# Patient Record
Sex: FEMALE | Race: WHITE | NOT HISPANIC OR LATINO | Employment: FULL TIME | URBAN - METROPOLITAN AREA
[De-identification: names, ages, dates, MRNs, and addresses within clinical notes are randomized per-mention and may not be internally consistent; named-entity substitution may affect disease eponyms.]

---

## 2022-02-10 ENCOUNTER — INITIAL PRENATAL (OUTPATIENT)
Dept: OBSTETRICS AND GYNECOLOGY | Facility: CLINIC | Age: 37
End: 2022-02-10
Payer: COMMERCIAL

## 2022-02-10 VITALS
BODY MASS INDEX: 24.32 KG/M2 | SYSTOLIC BLOOD PRESSURE: 92 MMHG | WEIGHT: 173.75 LBS | DIASTOLIC BLOOD PRESSURE: 58 MMHG | HEIGHT: 71 IN

## 2022-02-10 DIAGNOSIS — R30.0 DYSURIA: ICD-10-CM

## 2022-02-10 DIAGNOSIS — O21.9 NAUSEA/VOMITING IN PREGNANCY: Primary | ICD-10-CM

## 2022-02-10 LAB
BACTERIA #/AREA URNS AUTO: ABNORMAL /HPF
BILIRUB UR QL STRIP: NEGATIVE
CLARITY UR REFRACT.AUTO: ABNORMAL
COLOR UR AUTO: YELLOW
GLUCOSE UR QL STRIP: NEGATIVE
HGB UR QL STRIP: NEGATIVE
KETONES UR QL STRIP: NEGATIVE
LEUKOCYTE ESTERASE UR QL STRIP: ABNORMAL
MICROSCOPIC COMMENT: ABNORMAL
NITRITE UR QL STRIP: NEGATIVE
PH UR STRIP: 7 [PH] (ref 5–8)
PROT UR QL STRIP: NEGATIVE
RBC #/AREA URNS AUTO: 2 /HPF (ref 0–4)
SP GR UR STRIP: 1.02 (ref 1–1.03)
SQUAMOUS #/AREA URNS AUTO: 8 /HPF
URN SPEC COLLECT METH UR: ABNORMAL
WBC #/AREA URNS AUTO: 4 /HPF (ref 0–5)
YEAST UR QL AUTO: ABNORMAL

## 2022-02-10 PROCEDURE — 99999 PR PBB SHADOW E&M-NEW PATIENT-LVL III: CPT | Mod: PBBFAC,,, | Performed by: OBSTETRICS & GYNECOLOGY

## 2022-02-10 PROCEDURE — 87088 URINE BACTERIA CULTURE: CPT | Performed by: OBSTETRICS & GYNECOLOGY

## 2022-02-10 PROCEDURE — 87186 SC STD MICRODIL/AGAR DIL: CPT | Performed by: OBSTETRICS & GYNECOLOGY

## 2022-02-10 PROCEDURE — 87086 URINE CULTURE/COLONY COUNT: CPT | Performed by: OBSTETRICS & GYNECOLOGY

## 2022-02-10 PROCEDURE — 81001 URINALYSIS AUTO W/SCOPE: CPT | Performed by: OBSTETRICS & GYNECOLOGY

## 2022-02-10 PROCEDURE — 87077 CULTURE AEROBIC IDENTIFY: CPT | Performed by: OBSTETRICS & GYNECOLOGY

## 2022-02-10 PROCEDURE — 0500F PR INITIAL PRENATAL CARE VISIT: ICD-10-PCS | Mod: CPTII,S$GLB,, | Performed by: OBSTETRICS & GYNECOLOGY

## 2022-02-10 PROCEDURE — 99999 PR PBB SHADOW E&M-NEW PATIENT-LVL III: ICD-10-PCS | Mod: PBBFAC,,, | Performed by: OBSTETRICS & GYNECOLOGY

## 2022-02-10 PROCEDURE — 0500F INITIAL PRENATAL CARE VISIT: CPT | Mod: CPTII,S$GLB,, | Performed by: OBSTETRICS & GYNECOLOGY

## 2022-02-10 RX ORDER — PROMETHAZINE HYDROCHLORIDE 25 MG/1
25 TABLET ORAL EVERY 4 HOURS
COMMUNITY
End: 2023-01-17

## 2022-02-10 RX ORDER — ONDANSETRON 4 MG/1
4 TABLET, ORALLY DISINTEGRATING ORAL EVERY 6 HOURS PRN
Qty: 30 TABLET | Refills: 2 | Status: SHIPPED | OUTPATIENT
Start: 2022-02-10 | End: 2023-06-08

## 2022-02-10 RX ORDER — ONDANSETRON 4 MG/1
8 TABLET, FILM COATED ORAL 2 TIMES DAILY
COMMUNITY
End: 2023-11-02

## 2022-02-10 NOTE — PROGRESS NOTES
"  Reason for visit: Initial Prenatal Visit and Nausea (C/o nausea this morning has Phenergan and Zofran)      HPI:   36 y.o., at 14w4d by Estimated Date of Delivery: 8/7/22    Pt will be splitting care between [facility] and [place]. Presents today for 14wk appt. First visit at [facility]. 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  Experiencing N/V and questions about zofran safety in pregnancy   Experiencing constipation and questions about Mg supplementation in pregnancy  Hopes to deliver in [place] via planned C/S due to history of fundal mymectomy   C/o dysuria x3 days    - Cramping: N  - Bleeding: N  - Discharge: N  - Fetal movement: Not yet   - Nausea: Y  - Vomiting: Y  - Headache: N      Reviewed:    Past medical, surgical, social, family, and obstetric history: Reviewed and updated in EMR.  Medications: Reviewed and updated in EMR.  Allergies: Patient has no known allergies.    Pregnancy dating, labs, ultrasound reports, prenatal testing, and problem list: Reviewed and updated in EMR.  Outside records: None but available on patients phone  Independent interpretation of tests: NA  Discussion with another healthcare professional: NA      Vitals: BP (!) 92/58   Ht 5' 11" (1.803 m)   Wt 78.8 kg (173 lb 11.6 oz)   BMI 24.23 kg/m²     Physical exam:  GENERAL: No acute distress  ABD: Gravid below umbilicus  FHT: 160      Assessment and Plan:    Nausea/vomiting in pregnancy  -     ondansetron (ZOFRAN-ODT) 4 MG TbDL; Take 1 tablet (4 mg total) by mouth every 6 (six) hours as needed (Nausea and vomiting).  Dispense: 30 tablet; Refill: 2    Dysuria  -     Urine culture  -     Urinalysis         Discussed safety of zofran in pregnancy, and counseled regarding instructions for use   Discussed pts chronic constipation and counseled regarding options for supplementation, including Mg (as recommended by outside provider)   Discussed plan for care between [facility] and [facility]. Pt scheduled to see [place] " physicians through 20wk appointment. Scheduled for 24-26wk glucose screen at [facility] precautions given  Follow-up: 4 weeks at [facility]     Rosalie Rosario  MS3    Seen and examined.  Agree with note.  All questions answered  TIGRE Reece MD

## 2022-02-11 ENCOUNTER — PATIENT MESSAGE (OUTPATIENT)
Dept: OBSTETRICS AND GYNECOLOGY | Facility: CLINIC | Age: 37
End: 2022-02-11
Payer: COMMERCIAL

## 2022-02-11 DIAGNOSIS — R30.0 DYSURIA DURING PREGNANCY, ANTEPARTUM: Primary | ICD-10-CM

## 2022-02-11 DIAGNOSIS — O26.899 DYSURIA DURING PREGNANCY, ANTEPARTUM: Primary | ICD-10-CM

## 2022-02-11 RX ORDER — NITROFURANTOIN 25; 75 MG/1; MG/1
100 CAPSULE ORAL 2 TIMES DAILY
Qty: 14 CAPSULE | Refills: 0 | Status: SHIPPED | OUTPATIENT
Start: 2022-02-11 | End: 2022-02-18

## 2022-02-13 LAB — BACTERIA UR CULT: ABNORMAL

## 2022-02-16 ENCOUNTER — ROUTINE PRENATAL (OUTPATIENT)
Dept: OBSTETRICS AND GYNECOLOGY | Facility: CLINIC | Age: 37
End: 2022-02-16
Payer: COMMERCIAL

## 2022-02-16 VITALS — WEIGHT: 179 LBS | SYSTOLIC BLOOD PRESSURE: 90 MMHG | DIASTOLIC BLOOD PRESSURE: 58 MMHG | BODY MASS INDEX: 24.97 KG/M2
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
## 2022-02-16 PROCEDURE — 99999 PR PBB SHADOW E&M-EST. PATIENT-LVL II: ICD-10-PCS | Mod: PBBFAC,,, | Performed by: OBSTETRICS & GYNECOLOGY

## 2022-02-16 PROCEDURE — 99999 PR PBB SHADOW E&M-EST. PATIENT-LVL II: CPT | Mod: PBBFAC,,, | Performed by: OBSTETRICS & GYNECOLOGY

## 2022-02-16 PROCEDURE — 0502F PR SUBSEQUENT PRENATAL CARE: ICD-10-PCS | Mod: CPTII,S$GLB,, | Performed by: OBSTETRICS & GYNECOLOGY

## 2022-02-16 PROCEDURE — 0502F SUBSEQUENT PRENATAL CARE: CPT | Mod: CPTII,S$GLB,, | Performed by: OBSTETRICS & GYNECOLOGY

## 2022-02-16 RX ORDER — FAMOTIDINE 20 MG/1
20 TABLET, FILM COATED ORAL 2 TIMES DAILY
COMMUNITY
End: 2023-11-30

## 2022-02-16 NOTE — PROGRESS NOTES
Reason for visit: Routine Prenatal Visit      HPI:   36 y.o., at 15w3d by Estimated Date of Delivery: 8/7/22      Pt presents for evaluation after falling last night. Reports she was in the bathroom vomiting and when she stood up, felt lightheaded and then tripped over her bathmat. States she fell onto her elbows, but hit the side of her abdomen. Reports having very minor cramping, but no bleeding. States she overall is feeling fine, but wanted to get checked out to make sure everything is okay.     Also reports nausea/vomiting is at baseline for her. States she is able to keep food down for several hours, but then does have episodes of vomiting almost daily. Still gaining weight and keeping down liquids and some solids.     - Contractions:  No  - Bleeding:  No  - Loss of fluid:  No  - Fetal movement: N/A  - Nausea:  Yes  - Vomiting:  Yes  - Headache:  No      Reviewed:    Past medical, surgical, social, family, and obstetric history: Reviewed and updated in EMR.  Medications: Reviewed and updated in EMR.  Allergies: Patient has no known allergies.    Pregnancy dating, labs, ultrasound reports, prenatal testing, and problem list: Reviewed and updated in EMR.  Outside records: Available records reviewed      Vitals: BP (!) 90/58   Wt 81.2 kg (179 lb 0.2 oz)   BMI 24.97 kg/m²     Physical exam:  GENERAL: No acute distress  ABD: Gravid    BSUS: Fetal movement with HR visually 150s      Assessment and Plan:    Fall, initial encounter      Patient reassured that given no bleeding/cramping with active FM and normal-appearing FCA on BSUS, it is unlikely that she will have long-term sequelae from fall. Also discussed that highest risk of sequelae is within 24 hours from fall and to continue looking for cramping/bleeding and notify us if these occur. All questions answered.      Pain and bleeding precautions given  Follow-up: 3 weeks      Jennifer Mansfield MD   PGY-2, OB-GYN

## 2022-02-28 ENCOUNTER — PATIENT MESSAGE (OUTPATIENT)
Dept: OBSTETRICS AND GYNECOLOGY | Facility: CLINIC | Age: 37
End: 2022-02-28
Payer: COMMERCIAL

## 2022-03-02 ENCOUNTER — TELEPHONE (OUTPATIENT)
Dept: OBSTETRICS AND GYNECOLOGY | Facility: CLINIC | Age: 37
End: 2022-03-02
Payer: COMMERCIAL

## 2022-03-03 DIAGNOSIS — O09.529 ANTEPARTUM MULTIGRAVIDA OF ADVANCED MATERNAL AGE: Primary | ICD-10-CM

## 2022-03-28 ENCOUNTER — TELEPHONE (OUTPATIENT)
Dept: RESEARCH | Facility: OTHER | Age: 37
End: 2022-03-28
Payer: COMMERCIAL

## 2022-03-28 ENCOUNTER — PATIENT MESSAGE (OUTPATIENT)
Dept: MATERNAL FETAL MEDICINE | Facility: CLINIC | Age: 37
End: 2022-03-28
Payer: COMMERCIAL

## 2022-03-28 NOTE — TELEPHONE ENCOUNTER
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  Wants us to discuss with the PI and feels this may conflict with protocol.  Also wants to discuss with her  who is also MD.  Requested we call her back on Wednesday.  CRC will contact PI and call patient on Wednesday.  "

## 2022-03-29 ENCOUNTER — PROCEDURE VISIT (OUTPATIENT)
Dept: MATERNAL FETAL MEDICINE | Facility: CLINIC | Age: 37
End: 2022-03-29
Payer: COMMERCIAL

## 2022-03-29 DIAGNOSIS — Z36.89 ENCOUNTER FOR ULTRASOUND TO ASSESS FETAL GROWTH: Primary | ICD-10-CM

## 2022-03-29 DIAGNOSIS — O09.529 ANTEPARTUM MULTIGRAVIDA OF ADVANCED MATERNAL AGE: ICD-10-CM

## 2022-03-29 PROCEDURE — 76811 OB US DETAILED SNGL FETUS: CPT | Mod: S$GLB,,, | Performed by: OBSTETRICS & GYNECOLOGY

## 2022-03-29 PROCEDURE — 76811 US MFM PROCEDURE (VIEWPOINT): ICD-10-PCS | Mod: S$GLB,,, | Performed by: OBSTETRICS & GYNECOLOGY

## 2022-04-12 ENCOUNTER — TELEPHONE (OUTPATIENT)
Dept: PEDIATRICS | Facility: CLINIC | Age: 37
End: 2022-04-12
Payer: COMMERCIAL

## 2022-04-12 NOTE — TELEPHONE ENCOUNTER
----- Message from Jed Ray RN sent at 4/11/2022  4:09 PM CDT -----  Contact: Pt @[phone]  Can you help with this please?  ----- Message -----  From: Rosalia Marie  Sent: 4/11/2022   4:03 PM CDT  To: [facility]klin Staff    Patient would like to get medical advice.  meet and greet     Would you like a call back, or a response through your [facility] portal?:  call back       Comments:     I am unable to schedule an meet and greet virtual or in person with the provider. Mom will be having babies in [place] and will be traveling back and forth for appt. Please call pt back to schedule an appt.

## 2022-05-20 ENCOUNTER — PATIENT MESSAGE (OUTPATIENT)
Dept: OBSTETRICS AND GYNECOLOGY | Facility: CLINIC | Age: 37
End: 2022-05-20
Payer: COMMERCIAL

## 2022-05-24 ENCOUNTER — TELEPHONE (OUTPATIENT)
Dept: OBSTETRICS AND GYNECOLOGY | Facility: CLINIC | Age: 37
End: 2022-05-24
Payer: COMMERCIAL

## 2022-05-24 NOTE — TELEPHONE ENCOUNTER
I have attempted without success to contact this patient by phone lvm pt need weekly isaias appointments per Dr. Reece

## 2022-05-26 ENCOUNTER — ROUTINE PRENATAL (OUTPATIENT)
Dept: OBSTETRICS AND GYNECOLOGY | Facility: CLINIC | Age: 37
End: 2022-05-26
Attending: OBSTETRICS & GYNECOLOGY
Payer: COMMERCIAL

## 2022-05-26 VITALS
DIASTOLIC BLOOD PRESSURE: 60 MMHG | WEIGHT: 209.44 LBS | BODY MASS INDEX: 29.21 KG/M2 | SYSTOLIC BLOOD PRESSURE: 102 MMHG

## 2022-05-26 DIAGNOSIS — O09.93 SUPERVISION OF HIGH RISK PREGNANCY IN THIRD TRIMESTER: ICD-10-CM

## 2022-05-26 DIAGNOSIS — O09.513 PRIMIGRAVIDA OF ADVANCED MATERNAL AGE IN THIRD TRIMESTER: ICD-10-CM

## 2022-05-26 DIAGNOSIS — O34.29 PREGNANCY WITH HISTORY OF UTERINE MYOMECTOMY: ICD-10-CM

## 2022-05-26 PROCEDURE — 0502F PR SUBSEQUENT PRENATAL CARE: ICD-10-PCS | Mod: CPTII,S$GLB,, | Performed by: OBSTETRICS & GYNECOLOGY

## 2022-05-26 PROCEDURE — 99999 PR PBB SHADOW E&M-EST. PATIENT-LVL II: CPT | Mod: PBBFAC,,, | Performed by: OBSTETRICS & GYNECOLOGY

## 2022-05-26 PROCEDURE — 0502F SUBSEQUENT PRENATAL CARE: CPT | Mod: CPTII,S$GLB,, | Performed by: OBSTETRICS & GYNECOLOGY

## 2022-05-26 PROCEDURE — 99999 PR PBB SHADOW E&M-EST. PATIENT-LVL II: ICD-10-PCS | Mod: PBBFAC,,, | Performed by: OBSTETRICS & GYNECOLOGY

## 2022-05-26 RX ORDER — ONDANSETRON 8 MG/1
8 TABLET, ORALLY DISINTEGRATING ORAL EVERY 8 HOURS PRN
COMMUNITY
Start: 2022-04-10 | End: 2023-06-08 | Stop reason: SDUPTHER

## 2022-05-26 RX ORDER — PROMETHAZINE HYDROCHLORIDE 12.5 MG/1
12.5 TABLET ORAL EVERY 6 HOURS PRN
COMMUNITY
Start: 2022-01-05 | End: 2023-01-17

## 2022-05-26 RX ORDER — DOXYLAMINE SUCCINATE AND PYRIDOXINE HYDROCHLORIDE, DELAYED RELEASE TABLETS 10 MG/10 MG 10; 10 MG/1; MG/1
TABLET, DELAYED RELEASE ORAL
COMMUNITY
Start: 2022-03-19 | End: 2023-11-02

## 2022-05-26 NOTE — PROGRESS NOTES
Good fm.  Denies ctx, vb, lof   She had questions about her c/s date and bmx.  Recommend record release in case she delivers while in [place] and to have a better understanding of her care.

## 2022-06-02 ENCOUNTER — ROUTINE PRENATAL (OUTPATIENT)
Dept: OBSTETRICS AND GYNECOLOGY | Facility: CLINIC | Age: 37
End: 2022-06-02
Attending: OBSTETRICS & GYNECOLOGY
Payer: COMMERCIAL

## 2022-06-02 VITALS
BODY MASS INDEX: 29.52 KG/M2 | DIASTOLIC BLOOD PRESSURE: 62 MMHG | SYSTOLIC BLOOD PRESSURE: 102 MMHG | WEIGHT: 211.63 LBS

## 2022-06-02 DIAGNOSIS — O09.513 PRIMIGRAVIDA OF ADVANCED MATERNAL AGE IN THIRD TRIMESTER: ICD-10-CM

## 2022-06-02 DIAGNOSIS — O34.29 PREGNANCY WITH HISTORY OF UTERINE MYOMECTOMY: ICD-10-CM

## 2022-06-02 DIAGNOSIS — O09.93 SUPERVISION OF HIGH RISK PREGNANCY IN THIRD TRIMESTER: Primary | ICD-10-CM

## 2022-06-02 PROCEDURE — 0502F SUBSEQUENT PRENATAL CARE: CPT | Mod: CPTII,S$GLB,, | Performed by: OBSTETRICS & GYNECOLOGY

## 2022-06-02 PROCEDURE — 0502F PR SUBSEQUENT PRENATAL CARE: ICD-10-PCS | Mod: CPTII,S$GLB,, | Performed by: OBSTETRICS & GYNECOLOGY

## 2022-06-02 PROCEDURE — 99999 PR PBB SHADOW E&M-EST. PATIENT-LVL II: CPT | Mod: PBBFAC,,, | Performed by: OBSTETRICS & GYNECOLOGY

## 2022-06-02 PROCEDURE — 99999 PR PBB SHADOW E&M-EST. PATIENT-LVL II: ICD-10-PCS | Mod: PBBFAC,,, | Performed by: OBSTETRICS & GYNECOLOGY

## 2022-06-02 NOTE — PROGRESS NOTES
Pregnancy dating, labs, ultrasound reports, prenatal testing, and problem list; prior records and results; and available outside records were reviewed and updated in EMR.  Pertinent findings were noted below.    Reason for Visit   Routine Prenatal Visit    HPI   37 y.o., at 30w4d by Estimated Date of Delivery: 22      No new complaints. Pt has been checking BP regularly and everything has been within normal limits.     Contractions: No   Bleeding: No   Loss of fluid: No   Fetal movement: Yes   Nausea: No   Vomiting: No   Headache: No     Exam   /62   Wt 96 kg (211 lb 10.3 oz)   BMI 29.52 kg/m²     GENERAL: No acute distress  ABD: Gravid. Fetal Heart Tones: 142 BPM. Fundal Height: 32 cm.    Assessment and Plan   Supervision of high risk pregnancy in third trimester    Primigravida of advanced maternal age in third trimester    Pregnancy with history of uterine myomectomy             labor and Labor precautions given  Follow-up: 3 weeks     Mychal Holly Student

## 2022-06-02 NOTE — PROGRESS NOTES
Seen and examined.  Agree with note.  All questions answered  Discussed history of myomectomy and recommendations regarding delivery timing.  I recommend that she bring in her op note to be able to  appropriately.  I also recommend getting her prenatal records from [place] to able to better answer her questions regarding her care.

## 2022-06-08 ENCOUNTER — PATIENT MESSAGE (OUTPATIENT)
Dept: OBSTETRICS AND GYNECOLOGY | Facility: CLINIC | Age: 37
End: 2022-06-08
Payer: COMMERCIAL

## 2022-06-16 ENCOUNTER — TELEPHONE (OUTPATIENT)
Dept: OBSTETRICS AND GYNECOLOGY | Facility: CLINIC | Age: 37
End: 2022-06-16
Payer: COMMERCIAL

## 2022-06-16 NOTE — TELEPHONE ENCOUNTER
S/w pt , states she has covid  States She is not symptomatic  Advised to isolate/quarentine herself  Stay hydrated    If she becomes symptomatic she needs to go to [place]

## 2022-06-16 NOTE — TELEPHONE ENCOUNTER
----- Message from Glen Sellers sent at 6/16/2022 10:17 AM CDT -----  Name of Who is Calling: YULIA BURCH          What is the request in detail: The patient is calling to speak to the nurse in regards to a problem she is having now. Please advise          Can the clinic reply by [facility]:Yes         What Number to Call Back if not in [facility]: [phone]

## 2023-01-17 ENCOUNTER — OFFICE VISIT (OUTPATIENT)
Dept: FAMILY MEDICINE | Facility: CLINIC | Age: 38
End: 2023-01-17
Payer: COMMERCIAL

## 2023-01-17 VITALS
HEIGHT: 71 IN | DIASTOLIC BLOOD PRESSURE: 64 MMHG | SYSTOLIC BLOOD PRESSURE: 107 MMHG | TEMPERATURE: 99 F | WEIGHT: 176.56 LBS | RESPIRATION RATE: 16 BRPM | BODY MASS INDEX: 24.72 KG/M2

## 2023-01-17 DIAGNOSIS — M54.50 ACUTE LEFT-SIDED LOW BACK PAIN WITHOUT SCIATICA: Primary | ICD-10-CM

## 2023-01-17 PROBLEM — O34.29 PREGNANCY WITH HISTORY OF UTERINE MYOMECTOMY: Status: RESOLVED | Noted: 2022-05-26 | Resolved: 2023-01-17

## 2023-01-17 PROBLEM — O09.513 PRIMIGRAVIDA OF ADVANCED MATERNAL AGE IN THIRD TRIMESTER: Status: RESOLVED | Noted: 2022-05-26 | Resolved: 2023-01-17

## 2023-01-17 PROBLEM — O09.93 SUPERVISION OF HIGH RISK PREGNANCY IN THIRD TRIMESTER: Status: RESOLVED | Noted: 2022-05-26 | Resolved: 2023-01-17

## 2023-01-17 LAB
BILIRUB SERPL-MCNC: ABNORMAL MG/DL
BLOOD, POC UA: 250
GLUCOSE UR QL STRIP: ABNORMAL
KETONES UR QL STRIP: ABNORMAL
LEUKOCYTE ESTERASE URINE, POC: ABNORMAL
NITRITE, POC UA: ABNORMAL
PH, POC UA: 6
PROTEIN, POC: ABNORMAL
SPECIFIC GRAVITY, POC UA: 1.01
UROBILINOGEN, POC UA: NORMAL

## 2023-01-17 PROCEDURE — 96372 THER/PROPH/DIAG INJ SC/IM: CPT | Mod: S$GLB,,, | Performed by: STUDENT IN AN ORGANIZED HEALTH CARE EDUCATION/TRAINING PROGRAM

## 2023-01-17 PROCEDURE — 3074F PR MOST RECENT SYSTOLIC BLOOD PRESSURE < 130 MM HG: ICD-10-PCS | Mod: CPTII,S$GLB,, | Performed by: STUDENT IN AN ORGANIZED HEALTH CARE EDUCATION/TRAINING PROGRAM

## 2023-01-17 PROCEDURE — 1159F MED LIST DOCD IN RCRD: CPT | Mod: CPTII,S$GLB,, | Performed by: STUDENT IN AN ORGANIZED HEALTH CARE EDUCATION/TRAINING PROGRAM

## 2023-01-17 PROCEDURE — 99999 PR PBB SHADOW E&M-EST. PATIENT-LVL IV: CPT | Mod: PBBFAC,,, | Performed by: STUDENT IN AN ORGANIZED HEALTH CARE EDUCATION/TRAINING PROGRAM

## 2023-01-17 PROCEDURE — 3078F PR MOST RECENT DIASTOLIC BLOOD PRESSURE < 80 MM HG: ICD-10-PCS | Mod: CPTII,S$GLB,, | Performed by: STUDENT IN AN ORGANIZED HEALTH CARE EDUCATION/TRAINING PROGRAM

## 2023-01-17 PROCEDURE — 3078F DIAST BP <80 MM HG: CPT | Mod: CPTII,S$GLB,, | Performed by: STUDENT IN AN ORGANIZED HEALTH CARE EDUCATION/TRAINING PROGRAM

## 2023-01-17 PROCEDURE — 96372 PR INJECTION,THERAP/PROPH/DIAG2ST, IM OR SUBCUT: ICD-10-PCS | Mod: S$GLB,,, | Performed by: STUDENT IN AN ORGANIZED HEALTH CARE EDUCATION/TRAINING PROGRAM

## 2023-01-17 PROCEDURE — 81003 URINALYSIS AUTO W/O SCOPE: CPT | Mod: QW,S$GLB,, | Performed by: STUDENT IN AN ORGANIZED HEALTH CARE EDUCATION/TRAINING PROGRAM

## 2023-01-17 PROCEDURE — 3008F PR BODY MASS INDEX (BMI) DOCUMENTED: ICD-10-PCS | Mod: CPTII,S$GLB,, | Performed by: STUDENT IN AN ORGANIZED HEALTH CARE EDUCATION/TRAINING PROGRAM

## 2023-01-17 PROCEDURE — 3074F SYST BP LT 130 MM HG: CPT | Mod: CPTII,S$GLB,, | Performed by: STUDENT IN AN ORGANIZED HEALTH CARE EDUCATION/TRAINING PROGRAM

## 2023-01-17 PROCEDURE — 99214 PR OFFICE/OUTPT VISIT, EST, LEVL IV, 30-39 MIN: ICD-10-PCS | Mod: 25,S$GLB,, | Performed by: STUDENT IN AN ORGANIZED HEALTH CARE EDUCATION/TRAINING PROGRAM

## 2023-01-17 PROCEDURE — 3008F BODY MASS INDEX DOCD: CPT | Mod: CPTII,S$GLB,, | Performed by: STUDENT IN AN ORGANIZED HEALTH CARE EDUCATION/TRAINING PROGRAM

## 2023-01-17 PROCEDURE — 81003 POCT URINALYSIS: ICD-10-PCS | Mod: QW,S$GLB,, | Performed by: STUDENT IN AN ORGANIZED HEALTH CARE EDUCATION/TRAINING PROGRAM

## 2023-01-17 PROCEDURE — 1159F PR MEDICATION LIST DOCUMENTED IN MEDICAL RECORD: ICD-10-PCS | Mod: CPTII,S$GLB,, | Performed by: STUDENT IN AN ORGANIZED HEALTH CARE EDUCATION/TRAINING PROGRAM

## 2023-01-17 PROCEDURE — 99999 PR PBB SHADOW E&M-EST. PATIENT-LVL IV: ICD-10-PCS | Mod: PBBFAC,,, | Performed by: STUDENT IN AN ORGANIZED HEALTH CARE EDUCATION/TRAINING PROGRAM

## 2023-01-17 PROCEDURE — 99214 OFFICE O/P EST MOD 30 MIN: CPT | Mod: 25,S$GLB,, | Performed by: STUDENT IN AN ORGANIZED HEALTH CARE EDUCATION/TRAINING PROGRAM

## 2023-01-17 RX ORDER — KETOROLAC TROMETHAMINE 30 MG/ML
30 INJECTION, SOLUTION INTRAMUSCULAR; INTRAVENOUS
Status: COMPLETED | OUTPATIENT
Start: 2023-01-17 | End: 2023-01-17

## 2023-01-17 RX ORDER — HYDROCORTISONE 25 MG/G
1 OINTMENT TOPICAL 2 TIMES DAILY PRN
COMMUNITY
Start: 2022-12-24 | End: 2023-09-15

## 2023-01-17 RX ORDER — CYCLOBENZAPRINE HCL 10 MG
10 TABLET ORAL 3 TIMES DAILY PRN
Qty: 30 TABLET | Refills: 1 | Status: SHIPPED | OUTPATIENT
Start: 2023-01-17 | End: 2023-01-27

## 2023-01-17 RX ADMIN — KETOROLAC TROMETHAMINE 30 MG: 30 INJECTION, SOLUTION INTRAMUSCULAR; INTRAVENOUS at 01:01

## 2023-01-17 NOTE — PROGRESS NOTES
Administered Ketorolac 30 mg IM to right upper outer quad gluteus.  Patient tolerated injection well, no adverse reactions noted.

## 2023-01-17 NOTE — PROGRESS NOTES
SUBJECTIVE     Chief Complaint   Patient presents with    Back Pain       HPI  Sangeeta Paniagua is a 37 y.o. female with  constipation, fibroids, palpitations  that presents for evaluation of back pain.    She has low back pain which started about 3 days ago. It is located in her leftlower low back and does not radiate. It is not associated with numbness, tingling, or weakness. There is no saddle anesthesia nor loss of bowel and/or bladder control. The patient does not recall any inciting incident or injury but did carry her daughter in front facing carrier over the weekend. She has tried NSAIDs with little improvement.      PAST MEDICAL HISTORY:  Past Medical History:   Diagnosis Date    Constipation     Heart palpitations 09/2021    Pregnancy with history of uterine myomectomy 5/26/2022    Uterine fibroids affecting pregnancy        PAST SURGICAL HISTORY:  Past Surgical History:   Procedure Laterality Date    MYOMECTOMY  06/2016       FAMILY HISTORY:  Family History   Problem Relation Age of Onset    Breast cancer Maternal Grandmother 75    Colon cancer Neg Hx     Ovarian cancer Neg Hx        ALLERGIES AND MEDICATIONS: updated and reviewed.  Review of patient's allergies indicates:  No Known Allergies  Current Outpatient Medications   Medication Sig Dispense Refill    cyclobenzaprine (FLEXERIL) 10 MG tablet Take 1 tablet (10 mg total) by mouth 3 (three) times daily as needed for Muscle spasms. 30 tablet 1    doxylamine-pyridoxine, vit B6, (DICLEGIS) 10-10 mg TbEC PLEASE SEE ATTACHED FOR DETAILED DIRECTIONS      famotidine (PEPCID) 20 MG tablet Take 20 mg by mouth 2 (two) times daily.      hydrocortisone 2.5 % ointment 1 application 2 (two) times daily as needed. Apply to affected area      ondansetron (ZOFRAN) 4 MG tablet Take 8 mg by mouth 2 (two) times daily.      ondansetron (ZOFRAN-ODT) 4 MG TbDL Take 1 tablet (4 mg total) by mouth every 6 (six) hours as needed (Nausea and vomiting). (Patient not taking:  "Reported on 2/16/2022) 30 tablet 2    ondansetron (ZOFRAN-ODT) 8 MG TbDL Take 8 mg by mouth every 8 (eight) hours as needed.      prenatal vit no.124/iron/folic (PRENATAL VITAMIN ORAL) Take by mouth.       Current Facility-Administered Medications   Medication Dose Route Frequency Provider Last Rate Last Admin    ketorolac injection 30 mg  30 mg Intramuscular 1 time in Clinic/HOD Taylor Banegas MD           ROS  Review of Systems   Constitutional:  Negative for chills, fatigue and fever.   HENT:  Negative for rhinorrhea and sore throat.    Respiratory:  Negative for cough and shortness of breath.    Cardiovascular:  Negative for chest pain and palpitations.   Gastrointestinal:  Negative for constipation, diarrhea, nausea and vomiting.   Genitourinary:  Negative for dysuria.   Musculoskeletal:  Positive for back pain. Negative for joint swelling.   Skin:  Negative for rash and wound.   Neurological:  Negative for light-headedness and headaches.   Psychiatric/Behavioral:  Negative for dysphoric mood and sleep disturbance. The patient is not nervous/anxious.        OBJECTIVE     Physical Exam  Vitals:    01/17/23 1259   BP: 107/64   Resp: 16   Temp: 98.5 °F (36.9 °C)    Body mass index is 24.63 kg/m².  Weight: 80.1 kg (176 lb 9.4 oz)   Height: 5' 11" (180.3 cm)     Physical Exam  Vitals reviewed.   Constitutional:       General: She is not in acute distress.  HENT:      Right Ear: External ear normal.      Left Ear: External ear normal.      Nose: Nose normal.      Mouth/Throat:      Mouth: Mucous membranes are moist.   Eyes:      Extraocular Movements: Extraocular movements intact.      Conjunctiva/sclera: Conjunctivae normal.      Pupils: Pupils are equal, round, and reactive to light.   Pulmonary:      Effort: Pulmonary effort is normal.   Abdominal:      General: There is no distension.      Palpations: Abdomen is soft.   Musculoskeletal:         General: No swelling. Normal range of motion.      Cervical back: " Normal and normal range of motion.      Thoracic back: Normal.      Lumbar back: Spasms and tenderness present. No swelling, edema, deformity, signs of trauma, lacerations or bony tenderness. Normal range of motion. Negative right straight leg raise test and negative left straight leg raise test. No scoliosis.      Comments: NEG JINA MALIA   Skin:     General: Skin is warm and dry.      Findings: No rash.   Neurological:      General: No focal deficit present.      Mental Status: She is alert and oriented to person, place, and time.   Psychiatric:         Mood and Affect: Mood normal.         Behavior: Behavior normal.         Health Maintenance         Date Due Completion Date    Hepatitis C Screening Never done ---    Cervical Cancer Screening Never done ---    Lipid Panel Never done ---    COVID-19 Vaccine (1) Never done ---    HIV Screening Never done ---    TETANUS VACCINE Never done ---    Influenza Vaccine (1) Never done ---              ASSESSMENT     37 y.o. female with     1. Acute left-sided low back pain without sciatica        PLAN:     1. Acute left-sided low back pain without sciatica  - Pt w/ new onset low back pain. UA WNL w/ blood, pt on menstrual cycle. Will treat for muscle strain and RTC if not resolved 1 week. Advise core strengthening to prevent reoccurrence.  - ketorolac injection 30 mg  - cyclobenzaprine (FLEXERIL) 10 MG tablet; Take 1 tablet (10 mg total) by mouth 3 (three) times daily as needed for Muscle spasms.  Dispense: 30 tablet; Refill: 1        Taylor Banegas MD  01/17/2023 1:12 PM        No follow-ups on file.

## 2023-04-14 ENCOUNTER — PATIENT MESSAGE (OUTPATIENT)
Dept: OBSTETRICS AND GYNECOLOGY | Facility: CLINIC | Age: 38
End: 2023-04-14
Payer: COMMERCIAL

## 2023-05-09 LAB
ABO AND RH: NORMAL
HBV SURFACE AG SERPL QL IA: NEGATIVE
RUBELLA IGG SCREEN: NORMAL (ref 1.63–?)
RUBELLA IMMUNE STATUS: NORMAL
TSH SERPL DL<=0.005 MIU/L-ACNC: 3.46 UIU/ML (ref 0.41–5.9)

## 2023-05-15 ENCOUNTER — PATIENT MESSAGE (OUTPATIENT)
Dept: OBSTETRICS AND GYNECOLOGY | Facility: CLINIC | Age: 38
End: 2023-05-15
Payer: COMMERCIAL

## 2023-05-24 ENCOUNTER — PROCEDURE VISIT (OUTPATIENT)
Dept: OBSTETRICS AND GYNECOLOGY | Facility: CLINIC | Age: 38
End: 2023-05-24
Payer: COMMERCIAL

## 2023-05-24 DIAGNOSIS — O09.521 ADVANCED MATERNAL AGE IN MULTIGRAVIDA, FIRST TRIMESTER: Primary | ICD-10-CM

## 2023-05-26 NOTE — PROGRESS NOTES
Lab Documentation:    Order Type: Written Order placed in [facility]    Patient in for lab visit only per provider treatment plan.

## 2023-05-31 ENCOUNTER — TELEPHONE (OUTPATIENT)
Dept: OBSTETRICS AND GYNECOLOGY | Facility: CLINIC | Age: 38
End: 2023-05-31
Payer: COMMERCIAL

## 2023-05-31 NOTE — TELEPHONE ENCOUNTER
Spoke with pt in regards to Maternity 21 results. Informed pt per provider of negative results. Pt stated she didn't want to know gender, envelope will be left at  for pickup

## 2023-06-08 ENCOUNTER — INITIAL PRENATAL (OUTPATIENT)
Dept: OBSTETRICS AND GYNECOLOGY | Facility: CLINIC | Age: 38
End: 2023-06-08
Attending: OBSTETRICS & GYNECOLOGY
Payer: COMMERCIAL

## 2023-06-08 ENCOUNTER — TELEPHONE (OUTPATIENT)
Dept: OBSTETRICS AND GYNECOLOGY | Facility: CLINIC | Age: 38
End: 2023-06-08
Payer: COMMERCIAL

## 2023-06-08 VITALS
DIASTOLIC BLOOD PRESSURE: 62 MMHG | SYSTOLIC BLOOD PRESSURE: 108 MMHG | WEIGHT: 185.19 LBS | BODY MASS INDEX: 25.83 KG/M2

## 2023-06-08 DIAGNOSIS — Z98.890 HISTORY OF MYOMECTOMY: ICD-10-CM

## 2023-06-08 DIAGNOSIS — Z98.891 HISTORY OF CESAREAN DELIVERY: ICD-10-CM

## 2023-06-08 DIAGNOSIS — O21.9 NAUSEA/VOMITING IN PREGNANCY: ICD-10-CM

## 2023-06-08 DIAGNOSIS — Z3A.11 11 WEEKS GESTATION OF PREGNANCY: Primary | ICD-10-CM

## 2023-06-08 PROBLEM — O09.93 SUPERVISION OF HIGH RISK PREGNANCY IN THIRD TRIMESTER: Status: ACTIVE | Noted: 2023-06-08

## 2023-06-08 PROBLEM — D25.9 LEIOMYOMA OF UTERUS, UNSPECIFIED: Status: ACTIVE | Noted: 2017-06-19

## 2023-06-08 PROBLEM — E61.1 IRON DEFICIENCY: Status: ACTIVE | Noted: 2023-03-10

## 2023-06-08 PROBLEM — O34.219 HISTORY OF CESAREAN SECTION COMPLICATING PREGNANCY: Status: ACTIVE | Noted: 2023-06-08

## 2023-06-08 PROCEDURE — 99999 PR PBB SHADOW E&M-EST. PATIENT-LVL III: ICD-10-PCS | Mod: PBBFAC,,, | Performed by: OBSTETRICS & GYNECOLOGY

## 2023-06-08 PROCEDURE — 0502F SUBSEQUENT PRENATAL CARE: CPT | Mod: CPTII,S$GLB,, | Performed by: OBSTETRICS & GYNECOLOGY

## 2023-06-08 PROCEDURE — 99999 PR PBB SHADOW E&M-EST. PATIENT-LVL III: CPT | Mod: PBBFAC,,, | Performed by: OBSTETRICS & GYNECOLOGY

## 2023-06-08 PROCEDURE — 0502F PR SUBSEQUENT PRENATAL CARE: ICD-10-PCS | Mod: CPTII,S$GLB,, | Performed by: OBSTETRICS & GYNECOLOGY

## 2023-06-08 RX ORDER — ONDANSETRON 8 MG/1
8 TABLET, ORALLY DISINTEGRATING ORAL EVERY 8 HOURS PRN
Qty: 30 TABLET | Refills: 1 | Status: SHIPPED | OUTPATIENT
Start: 2023-06-08 | End: 2023-06-08

## 2023-06-08 RX ORDER — ONDANSETRON 8 MG/1
8 TABLET, ORALLY DISINTEGRATING ORAL EVERY 8 HOURS PRN
Qty: 30 TABLET | Refills: 1 | Status: SHIPPED | OUTPATIENT
Start: 2023-06-08 | End: 2023-11-02

## 2023-06-08 RX ORDER — PROMETHAZINE HYDROCHLORIDE 12.5 MG/1
12.5 TABLET ORAL 4 TIMES DAILY
Qty: 30 TABLET | Refills: 1 | Status: SHIPPED | OUTPATIENT
Start: 2023-06-08 | End: 2023-11-02

## 2023-06-08 NOTE — TELEPHONE ENCOUNTER
----- Message from Rc Gomez sent at 6/8/2023  9:10 AM CDT -----  Regarding: Appointment Time  Name of Who is Calling:  Patient          What is the request in detail:  Patient would like to know can she change her appointment time she stated something came up at work, she stated she can come before noon or after 3            Can the clinic reply by [facility]:Yes            What Number to Call Back if not in [facility]: [phone]

## 2023-06-08 NOTE — PROGRESS NOTES
Pregnancy dating, labs, ultrasound reports, prenatal testing, and problem list; prior records and results; and available outside records were reviewed and updated in EMR.  Pertinent findings were noted below.    Reason for Visit   Initial Prenatal Visit    HPI   38 y.o., at Unknown by Estimated Date of Delivery: None noted.    PMH:   PSH: h/o LTCS     Contractions: No   Bleeding: No   Loss of fluid: No   Fetal movement: N/A   Nausea: Yes   Vomiting: No   Headache: No     Exam   /62   Wt 84 kg (185 lb 3 oz)   BMI 25.83 kg/m²     GENERAL: No acute distress  ABD: Gravid  FHT: 140     Assessment and Plan   11 weeks gestation of pregnancy  -     promethazine (PHENERGAN) 12.5 MG Tab; Take 1 tablet (12.5 mg total) by mouth 4 (four) times daily.  Dispense: 30 tablet; Refill: 1  -     CBC W/ AUTO DIFFERENTIAL; Future; Expected date: 2023    Nausea/vomiting in pregnancy  -     promethazine (PHENERGAN) 12.5 MG Tab; Take 1 tablet (12.5 mg total) by mouth 4 (four) times daily.  Dispense: 30 tablet; Refill: 1  -     ondansetron (ZOFRAN-ODT) 8 MG TbDL; Take 1 tablet (8 mg total) by mouth every 8 (eight) hours as needed.  Dispense: 30 tablet; Refill: 1    History of  delivery    History of myomectomy      Patient lives between [facility] and [place]. 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 She plans to be in [facility] for the majority of her pregnancy. She is unsure where she would like to deliver. She will also be receiving OBGYN care from her cousin in [place]. She will be here until end of 2023, then [place] for 2 weeks. Reports she has received care with her cousin until now, plans to send records.     Routine PNC   CBC   Anatomy scan 18-20 weeks   Genetics: MAT21 negative, AFP next visit   Patient and partner does not want to know gender   Records request to confirm dating scan   N/V   Added Zofran and Phenergan   H/o myomectomy   Reports she had a 20cm fundal  fibroid removed  Op report requested   Plan for rLTCS, delivery timing TBD     Pain and bleeding precautions given  Follow-up: 4 weeks    Karis Gray MD  PGY 2  Obstetrics and Gynecology

## 2023-06-13 ENCOUNTER — PATIENT MESSAGE (OUTPATIENT)
Dept: MATERNAL FETAL MEDICINE | Facility: CLINIC | Age: 38
End: 2023-06-13
Payer: COMMERCIAL

## 2023-07-11 ENCOUNTER — PATIENT MESSAGE (OUTPATIENT)
Dept: OTHER | Facility: OTHER | Age: 38
End: 2023-07-11
Payer: COMMERCIAL

## 2023-07-13 ENCOUNTER — ROUTINE PRENATAL (OUTPATIENT)
Dept: OBSTETRICS AND GYNECOLOGY | Facility: CLINIC | Age: 38
End: 2023-07-13
Attending: OBSTETRICS & GYNECOLOGY
Payer: COMMERCIAL

## 2023-07-13 VITALS — BODY MASS INDEX: 26.26 KG/M2 | WEIGHT: 188.25 LBS | SYSTOLIC BLOOD PRESSURE: 94 MMHG | DIASTOLIC BLOOD PRESSURE: 60 MMHG

## 2023-07-13 DIAGNOSIS — O09.92 SUPERVISION OF HIGH RISK PREGNANCY IN SECOND TRIMESTER: ICD-10-CM

## 2023-07-13 DIAGNOSIS — O34.219 HISTORY OF CESAREAN SECTION COMPLICATING PREGNANCY: ICD-10-CM

## 2023-07-13 DIAGNOSIS — O34.29 PREGNANCY W/ HX OF UTERINE MYOMECTOMY: ICD-10-CM

## 2023-07-13 DIAGNOSIS — O09.522 MULTIGRAVIDA OF ADVANCED MATERNAL AGE IN SECOND TRIMESTER: ICD-10-CM

## 2023-07-13 DIAGNOSIS — O09.92 SUPERVISION OF HIGH RISK PREGNANCY IN SECOND TRIMESTER: Primary | ICD-10-CM

## 2023-07-13 DIAGNOSIS — Z98.890 HISTORY OF MYOMECTOMY: ICD-10-CM

## 2023-07-13 PROBLEM — O09.529 ADVANCED MATERNAL AGE IN MULTIGRAVIDA: Status: ACTIVE | Noted: 2023-07-13

## 2023-07-13 PROBLEM — O09.90 PREGNANCY, SUPERVISION, HIGH-RISK: Status: ACTIVE | Noted: 2023-07-13

## 2023-07-13 PROBLEM — O09.93 SUPERVISION OF HIGH RISK PREGNANCY IN THIRD TRIMESTER: Status: RESOLVED | Noted: 2023-06-08 | Resolved: 2023-07-13

## 2023-07-13 LAB
IRON SERPL-MCNC: 88 UG/DL (ref 30–160)
SATURATED IRON: 22 % (ref 20–50)
TOTAL IRON BINDING CAPACITY: 394 UG/DL (ref 250–450)
TRANSFERRIN SERPL-MCNC: 266 MG/DL (ref 200–375)

## 2023-07-13 PROCEDURE — 82105 ALPHA-FETOPROTEIN SERUM: CPT | Performed by: OBSTETRICS & GYNECOLOGY

## 2023-07-13 PROCEDURE — 82728 ASSAY OF FERRITIN: CPT | Performed by: OBSTETRICS & GYNECOLOGY

## 2023-07-13 PROCEDURE — 99999 PR PBB SHADOW E&M-EST. PATIENT-LVL III: ICD-10-PCS | Mod: PBBFAC,,, | Performed by: OBSTETRICS & GYNECOLOGY

## 2023-07-13 PROCEDURE — 99999 PR PBB SHADOW E&M-EST. PATIENT-LVL III: CPT | Mod: PBBFAC,,, | Performed by: OBSTETRICS & GYNECOLOGY

## 2023-07-13 PROCEDURE — 84466 ASSAY OF TRANSFERRIN: CPT | Performed by: OBSTETRICS & GYNECOLOGY

## 2023-07-13 PROCEDURE — 0502F SUBSEQUENT PRENATAL CARE: CPT | Mod: CPTII,S$GLB,, | Performed by: OBSTETRICS & GYNECOLOGY

## 2023-07-13 PROCEDURE — 0502F PR SUBSEQUENT PRENATAL CARE: ICD-10-PCS | Mod: CPTII,S$GLB,, | Performed by: OBSTETRICS & GYNECOLOGY

## 2023-07-13 NOTE — PROGRESS NOTES
Denies vb, lof, ctx.  She complains of occasional dizziness and bilateral numb feet.  She is not taking a pnv due to n/v but is taking folic acid.  She has significant problems with constipation and is taking linzess.  Sangeeta was seen today for routine prenatal visit and dizziness.    Diagnoses and all orders for this visit:    Supervision of high risk pregnancy in second trimester  -     Connected MOM Enrollment  -     Assign Connected MOM Program Consent Questionnaire  -     Maternal Screen AFP (Single Marker); Future  -     Iron and TIBC; Future  -     Ferritin; Future    History of  section complicating pregnancy  -     Connected MOM Enrollment  -     Assign Connected MOM Program Consent Questionnaire    Multigravida of advanced maternal age in second trimester  -     Connected MOM Enrollment  -     Assign Connected MOM Program Consent Questionnaire    History of myomectomy  -     Connected MOM Enrollment  -     Assign Connected MOM Program Consent Questionnaire    Pregnancy w/ hx of uterine myomectomy  -     Connected MOM Enrollment  -     Assign Connected MOM Program Consent Questionnaire     recommend compression hose.  Records requested again.

## 2023-07-14 LAB — FERRITIN SERPL-MCNC: 16 NG/ML (ref 20–300)

## 2023-07-15 NOTE — PROGRESS NOTES
Lab Documentation:    Order Type: Written Order placed in [facility]    Patient in for lab visit only per provider treatment plan.

## 2023-07-17 LAB
# FETUSES US: NORMAL
AFP INTERPRETATION: NORMAL
AFP MOM SERPL: 0.7
AFP SERPL-MCNC: 21.7 NG/ML
AFP SERPL-MCNC: NEGATIVE NG/ML
AGE AT DELIVERY: 38
GA (DAYS): 2 D
GA (WEEKS): 16 WK
GESTATIONAL AGE METHOD: NORMAL
IDDM PATIENT QL: NORMAL
SMOKING STATUS FTND: NO

## 2023-07-23 ENCOUNTER — HOSPITAL ENCOUNTER (EMERGENCY)
Facility: OTHER | Age: 38
Discharge: HOME | End: 2023-07-23
Attending: EMERGENCY MEDICINE
Payer: COMMERCIAL

## 2023-07-23 VITALS
WEIGHT: 190 LBS | BODY MASS INDEX: 26.6 KG/M2 | HEIGHT: 71 IN | SYSTOLIC BLOOD PRESSURE: 104 MMHG | OXYGEN SATURATION: 99 % | TEMPERATURE: 98 F | RESPIRATION RATE: 16 BRPM | DIASTOLIC BLOOD PRESSURE: 55 MMHG | HEART RATE: 70 BPM

## 2023-07-23 DIAGNOSIS — M51.36 ANNULAR TEAR OF LUMBAR DISC: Primary | ICD-10-CM

## 2023-07-23 DIAGNOSIS — S39.012A STRAIN OF LUMBAR REGION, INITIAL ENCOUNTER: ICD-10-CM

## 2023-07-23 DIAGNOSIS — M51.36 BULGING OF LUMBAR INTERVERTEBRAL DISC WITHOUT MYELOPATHY: ICD-10-CM

## 2023-07-23 PROCEDURE — 25000003 PHARM REV CODE 250: Performed by: EMERGENCY MEDICINE

## 2023-07-23 PROCEDURE — 99284 EMERGENCY DEPT VISIT MOD MDM: CPT | Mod: 25

## 2023-07-23 PROCEDURE — 63600175 PHARM REV CODE 636 W HCPCS: Performed by: EMERGENCY MEDICINE

## 2023-07-23 PROCEDURE — 96374 THER/PROPH/DIAG INJ IV PUSH: CPT

## 2023-07-23 RX ORDER — IBUPROFEN 600 MG/1
600 TABLET ORAL EVERY 6 HOURS PRN
Qty: 30 TABLET | Refills: 0 | Status: SHIPPED | OUTPATIENT
Start: 2023-07-23 | End: 2023-09-15

## 2023-07-23 RX ORDER — OXYCODONE HYDROCHLORIDE 5 MG/1
5 TABLET ORAL EVERY 6 HOURS PRN
Qty: 15 TABLET | Refills: 0 | Status: SHIPPED | OUTPATIENT
Start: 2023-07-23 | End: 2023-09-15

## 2023-07-23 RX ORDER — KETOROLAC TROMETHAMINE 30 MG/ML
10 INJECTION, SOLUTION INTRAMUSCULAR; INTRAVENOUS
Status: COMPLETED | OUTPATIENT
Start: 2023-07-23 | End: 2023-07-23

## 2023-07-23 RX ORDER — CYCLOBENZAPRINE HCL 5 MG
5 TABLET ORAL 3 TIMES DAILY PRN
Qty: 20 TABLET | Refills: 0 | Status: SHIPPED | OUTPATIENT
Start: 2023-07-23 | End: 2023-07-28

## 2023-07-23 RX ORDER — HYDROCODONE BITARTRATE AND ACETAMINOPHEN 5; 325 MG/1; MG/1
2 TABLET ORAL
Status: COMPLETED | OUTPATIENT
Start: 2023-07-23 | End: 2023-07-23

## 2023-07-23 RX ADMIN — HYDROCODONE BITARTRATE AND ACETAMINOPHEN 2 TABLET: 5; 325 TABLET ORAL at 01:07

## 2023-07-23 RX ADMIN — KETOROLAC TROMETHAMINE 10 MG: 30 INJECTION, SOLUTION INTRAMUSCULAR; INTRAVENOUS at 01:07
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
## 2023-07-23 NOTE — ED TRIAGE NOTES
Pt reports falling down the steps outside her home when walking outside. She states she slipped on a step and fell backwards onto her back. Pt says she is 5 months pregnant but did not hit her stomach. Pt reports excruciating mid back pain. AA&O x3. Denies hitting head or losing consciousness.

## 2023-08-09 ENCOUNTER — ROUTINE PRENATAL (OUTPATIENT)
Dept: OBSTETRICS AND GYNECOLOGY | Facility: CLINIC | Age: 38
End: 2023-08-09
Payer: COMMERCIAL

## 2023-08-09 ENCOUNTER — PATIENT MESSAGE (OUTPATIENT)
Dept: MATERNAL FETAL MEDICINE | Facility: CLINIC | Age: 38
End: 2023-08-09
Payer: COMMERCIAL

## 2023-08-09 VITALS
BODY MASS INDEX: 27.67 KG/M2 | DIASTOLIC BLOOD PRESSURE: 64 MMHG | WEIGHT: 198.44 LBS | SYSTOLIC BLOOD PRESSURE: 100 MMHG

## 2023-08-09 DIAGNOSIS — N89.8 VAGINAL DISCHARGE DURING PREGNANCY IN SECOND TRIMESTER: Primary | ICD-10-CM

## 2023-08-09 DIAGNOSIS — O26.892 LOW BACK PAIN DURING PREGNANCY IN SECOND TRIMESTER: ICD-10-CM

## 2023-08-09 DIAGNOSIS — M54.50 LOW BACK PAIN DURING PREGNANCY IN SECOND TRIMESTER: ICD-10-CM

## 2023-08-09 DIAGNOSIS — O26.892 VAGINAL DISCHARGE DURING PREGNANCY IN SECOND TRIMESTER: Primary | ICD-10-CM

## 2023-08-09 DIAGNOSIS — O09.522 MULTIGRAVIDA OF ADVANCED MATERNAL AGE IN SECOND TRIMESTER: ICD-10-CM

## 2023-08-09 PROCEDURE — 0502F PR SUBSEQUENT PRENATAL CARE: ICD-10-PCS | Mod: CPTII,S$GLB,, | Performed by: OBSTETRICS & GYNECOLOGY

## 2023-08-09 PROCEDURE — 0502F SUBSEQUENT PRENATAL CARE: CPT | Mod: CPTII,S$GLB,, | Performed by: OBSTETRICS & GYNECOLOGY

## 2023-08-09 PROCEDURE — 99999 PR PBB SHADOW E&M-EST. PATIENT-LVL III: ICD-10-PCS | Mod: PBBFAC,,, | Performed by: OBSTETRICS & GYNECOLOGY

## 2023-08-09 PROCEDURE — 81514 NFCT DS BV&VAGINITIS DNA ALG: CPT | Performed by: OBSTETRICS & GYNECOLOGY

## 2023-08-09 PROCEDURE — 99999 PR PBB SHADOW E&M-EST. PATIENT-LVL III: CPT | Mod: PBBFAC,,, | Performed by: OBSTETRICS & GYNECOLOGY

## 2023-08-09 RX ORDER — CYCLOBENZAPRINE HCL 5 MG
5 TABLET ORAL 3 TIMES DAILY PRN
Qty: 30 TABLET | Refills: 0 | Status: SHIPPED | OUTPATIENT
Start: 2023-08-09 | End: 2023-08-19

## 2023-08-09 NOTE — PROGRESS NOTES
Pregnancy dating, labs, ultrasound reports, prenatal testing, and problem list; prior records and results; and available outside records were reviewed and updated in EMR.  Pertinent findings were noted below.    Reason for Visit: Routine Prenatal Visit    20w1d by Estimated Date of Delivery: 12/26/23    Had fall ~2 weeks ago with resulting bulging disc causing severe low back pain  C/o vaginal discharge with fishy odor    Blood pressure 100/64, weight 90 kg (198 lb 6.6 oz), currently breastfeeding.  TWG 21#  FHTs 150s    Vaginal discharge during pregnancy in second trimester  -     Vaginosis Screen by DNA Probe    Multigravida of advanced maternal age in second trimester  -     OB Glucose Screen; Future; Expected date: 08/09/2023  -     CBC Auto Differential; Future; Expected date: 08/09/2023    Low back pain during pregnancy in second trimester  -     Ambulatory referral/consult to Physical/Occupational Therapy; Future; Expected date: 08/16/2023      No cramping or bleeding.  +fetal movement. Labs reviewed and up to date.  2T labs ordered  Affirm sent via patient self swab - will treat based on results  Taking Flexeril and NSAIDs. Counseled to avoid NSAIDs as much as possible but especially after 32 weeks. Rx Flexeril provided. PT referral placed. Counseled on prenatal massage therapy and SI joint belt.  Anatomy US tomorrow    Pain and bleeding precautions given  Follow-up: 4 weeks

## 2023-08-10 ENCOUNTER — PROCEDURE VISIT (OUTPATIENT)
Dept: MATERNAL FETAL MEDICINE | Facility: CLINIC | Age: 38
End: 2023-08-10
Payer: COMMERCIAL

## 2023-08-10 DIAGNOSIS — Z36.89 ENCOUNTER FOR ULTRASOUND TO CHECK FETAL GROWTH: Primary | ICD-10-CM

## 2023-08-10 DIAGNOSIS — Z3A.11 11 WEEKS GESTATION OF PREGNANCY: ICD-10-CM

## 2023-08-10 LAB
BACTERIAL VAGINOSIS DNA: NEGATIVE
CANDIDA GLABRATA DNA: NEGATIVE
CANDIDA KRUSEI DNA: NEGATIVE
CANDIDA RRNA VAG QL PROBE: NEGATIVE
T VAGINALIS RRNA GENITAL QL PROBE: NEGATIVE

## 2023-08-10 PROCEDURE — 76811 OB US DETAILED SNGL FETUS: CPT | Mod: S$GLB,,, | Performed by: OBSTETRICS & GYNECOLOGY

## 2023-08-10 PROCEDURE — 76811 US MFM PROCEDURE (VIEWPOINT): ICD-10-PCS | Mod: S$GLB,,, | Performed by: OBSTETRICS & GYNECOLOGY

## 2023-09-05 ENCOUNTER — PATIENT MESSAGE (OUTPATIENT)
Dept: OTHER | Facility: OTHER | Age: 38
End: 2023-09-05
Payer: COMMERCIAL

## 2023-09-15 ENCOUNTER — PROCEDURE VISIT (OUTPATIENT)
Dept: OBSTETRICS AND GYNECOLOGY | Facility: CLINIC | Age: 38
End: 2023-09-15
Payer: COMMERCIAL

## 2023-09-15 ENCOUNTER — OFFICE VISIT (OUTPATIENT)
Dept: SURGERY | Facility: CLINIC | Age: 38
End: 2023-09-15
Payer: COMMERCIAL

## 2023-09-15 VITALS
DIASTOLIC BLOOD PRESSURE: 67 MMHG | SYSTOLIC BLOOD PRESSURE: 103 MMHG | WEIGHT: 204.13 LBS | HEIGHT: 71 IN | HEART RATE: 95 BPM | BODY MASS INDEX: 28.58 KG/M2

## 2023-09-15 DIAGNOSIS — K64.5 THROMBOSED EXTERNAL HEMORRHOID: Primary | ICD-10-CM

## 2023-09-15 DIAGNOSIS — O09.522 MULTIGRAVIDA OF ADVANCED MATERNAL AGE IN SECOND TRIMESTER: ICD-10-CM

## 2023-09-15 LAB
BASOPHILS # BLD AUTO: 0.03 K/UL (ref 0–0.2)
BASOPHILS NFR BLD: 0.3 % (ref 0–1.9)
DIFFERENTIAL METHOD: ABNORMAL
EOSINOPHIL # BLD AUTO: 0.1 K/UL (ref 0–0.5)
EOSINOPHIL NFR BLD: 0.5 % (ref 0–8)
ERYTHROCYTE [DISTWIDTH] IN BLOOD BY AUTOMATED COUNT: 14 % (ref 11.5–14.5)
GLUCOSE SERPL-MCNC: 110 MG/DL (ref 70–140)
HCT VFR BLD AUTO: 32.9 % (ref 37–48.5)
HGB BLD-MCNC: 10.3 G/DL (ref 12–16)
IMM GRANULOCYTES # BLD AUTO: 0.07 K/UL (ref 0–0.04)
IMM GRANULOCYTES NFR BLD AUTO: 0.8 % (ref 0–0.5)
LYMPHOCYTES # BLD AUTO: 2.2 K/UL (ref 1–4.8)
LYMPHOCYTES NFR BLD: 23.6 % (ref 18–48)
MCH RBC QN AUTO: 27.7 PG (ref 27–31)
MCHC RBC AUTO-ENTMCNC: 31.3 G/DL (ref 32–36)
MCV RBC AUTO: 88 FL (ref 82–98)
MONOCYTES # BLD AUTO: 0.6 K/UL (ref 0.3–1)
MONOCYTES NFR BLD: 6.7 % (ref 4–15)
NEUTROPHILS # BLD AUTO: 6.2 K/UL (ref 1.8–7.7)
NEUTROPHILS NFR BLD: 68.1 % (ref 38–73)
NRBC BLD-RTO: 0 /100 WBC
PLATELET # BLD AUTO: 201 K/UL (ref 150–450)
PMV BLD AUTO: 10.5 FL (ref 9.2–12.9)
RBC # BLD AUTO: 3.72 M/UL (ref 4–5.4)
WBC # BLD AUTO: 9.11 K/UL (ref 3.9–12.7)

## 2023-09-15 PROCEDURE — 1160F PR REVIEW ALL MEDS BY PRESCRIBER/CLIN PHARMACIST DOCUMENTED: ICD-10-PCS | Mod: CPTII,S$GLB,, | Performed by: NURSE PRACTITIONER

## 2023-09-15 PROCEDURE — 99999 PR PBB SHADOW E&M-EST. PATIENT-LVL III: CPT | Mod: PBBFAC,,, | Performed by: NURSE PRACTITIONER

## 2023-09-15 PROCEDURE — 1159F PR MEDICATION LIST DOCUMENTED IN MEDICAL RECORD: ICD-10-PCS | Mod: CPTII,S$GLB,, | Performed by: NURSE PRACTITIONER

## 2023-09-15 PROCEDURE — 3008F PR BODY MASS INDEX (BMI) DOCUMENTED: ICD-10-PCS | Mod: CPTII,S$GLB,, | Performed by: NURSE PRACTITIONER

## 2023-09-15 PROCEDURE — 99204 PR OFFICE/OUTPT VISIT, NEW, LEVL IV, 45-59 MIN: ICD-10-PCS | Mod: 25,S$GLB,, | Performed by: NURSE PRACTITIONER

## 2023-09-15 PROCEDURE — 46320 PR EXCISION THROMBOSED HEMORRHOID, EXTERNAL: ICD-10-PCS | Mod: S$GLB,,, | Performed by: NURSE PRACTITIONER

## 2023-09-15 PROCEDURE — 85025 COMPLETE CBC W/AUTO DIFF WBC: CPT | Performed by: OBSTETRICS & GYNECOLOGY

## 2023-09-15 PROCEDURE — 1159F MED LIST DOCD IN RCRD: CPT | Mod: CPTII,S$GLB,, | Performed by: NURSE PRACTITIONER

## 2023-09-15 PROCEDURE — 3078F DIAST BP <80 MM HG: CPT | Mod: CPTII,S$GLB,, | Performed by: NURSE PRACTITIONER

## 2023-09-15 PROCEDURE — 46320 REMOVAL OF HEMORRHOID CLOT: CPT | Mod: S$GLB,,, | Performed by: NURSE PRACTITIONER

## 2023-09-15 PROCEDURE — 3008F BODY MASS INDEX DOCD: CPT | Mod: CPTII,S$GLB,, | Performed by: NURSE PRACTITIONER

## 2023-09-15 PROCEDURE — 99999 PR PBB SHADOW E&M-EST. PATIENT-LVL III: ICD-10-PCS | Mod: PBBFAC,,, | Performed by: NURSE PRACTITIONER

## 2023-09-15 PROCEDURE — 82950 GLUCOSE TEST: CPT | Performed by: OBSTETRICS & GYNECOLOGY

## 2023-09-15 PROCEDURE — 99204 OFFICE O/P NEW MOD 45 MIN: CPT | Mod: 25,S$GLB,, | Performed by: NURSE PRACTITIONER

## 2023-09-15 PROCEDURE — 3078F PR MOST RECENT DIASTOLIC BLOOD PRESSURE < 80 MM HG: ICD-10-PCS | Mod: CPTII,S$GLB,, | Performed by: NURSE PRACTITIONER

## 2023-09-15 PROCEDURE — 1160F RVW MEDS BY RX/DR IN RCRD: CPT | Mod: CPTII,S$GLB,, | Performed by: NURSE PRACTITIONER

## 2023-09-15 PROCEDURE — 3074F PR MOST RECENT SYSTOLIC BLOOD PRESSURE < 130 MM HG: ICD-10-PCS | Mod: CPTII,S$GLB,, | Performed by: NURSE PRACTITIONER

## 2023-09-15 PROCEDURE — 3074F SYST BP LT 130 MM HG: CPT | Mod: CPTII,S$GLB,, | Performed by: NURSE PRACTITIONER

## 2023-09-15 RX ORDER — HYDROCORTISONE 25 MG/G
CREAM TOPICAL 2 TIMES DAILY
Qty: 28 G | Refills: 2 | Status: SHIPPED | OUTPATIENT
Start: 2023-09-15 | End: 2023-10-16

## 2023-09-15 NOTE — PROGRESS NOTES
"CRS Office Visit History and Physical    Referring Md:   Self, Aaarefpravin  No address on file    SUBJECTIVE:     Chief Complaint: thrombosed hemorrhoid    History of Present Illness:  The patient is new patient to this practice.   Course is as follows:  Patient is a 38 y.o. female currently 25 weeks presents with thrombosed external painful hemorrhoid.  Symptoms have been present for 2 days.  Significant constipation.   Linzess/senna prn.    Associated bleeding: no  Previous anorectal procedures: No  confirms straining/prolonged time on toilet with bowel movements.    Blood thinners: No    Review of patient's allergies indicates:  No Known Allergies    Past Medical History:   Diagnosis Date    Constipation     Heart palpitations 09/2021    Pregnancy with history of uterine myomectomy 5/26/2022    Uterine fibroids affecting pregnancy      Past Surgical History:   Procedure Laterality Date    MYOMECTOMY  06/2016     Family History   Problem Relation Age of Onset    Breast cancer Maternal Grandmother 75    Colon cancer Neg Hx     Ovarian cancer Neg Hx      Social History     Tobacco Use    Smoking status: Never    Smokeless tobacco: Never   Substance Use Topics    Alcohol use: Never    Drug use: Never        Review of Systems:  Review of Systems   Gastrointestinal:  Positive for constipation.       OBJECTIVE:     Vital Signs (Most Recent)  Blood Pressure 103/67 (BP Location: Left arm, Patient Position: Sitting)   Pulse 95   Height 5' 11" (1.803 m)   Weight 92.6 kg (204 lb 2.3 oz)   Body Mass Index 28.47 kg/m²     Physical Exam:  General: White female in no distress   Neuro: Alert and oriented to person, place, and time.  Moves all extremities.     HEENT: No icterus.  Trachea midline  Respiratory: Respirations are even and unlabored, no cough or audible wheezing  Skin: Warm dry and intact, No visible rashes, no jaundice    Labs reviewed today:  No results found for: "WBC", "HGB", "HCT", "PLT", "CHOL", "TRIG", " ""HDL", "LDLDIRECT", "ALT", "AST", "NA", "K", "CL", "CREATININE", "BUN", "CO2", "TSH", "PSA", "INR", "GLUF", "HGBA1C", "MICROALBUR"    Imaging reviewed today:  none    Endoscopy reviewed today:  none    Anorectal Exam:    Anal Skin: External hemorrhoids, thrombosed RPL    Digital Rectal Exam:  Deferred    Post Thrombosed External Hemorrhoid Excision    1. Pt gave verbal informed consent for excision of thrombosed external hemorrhoid    2. Area cleansed with betadine, anesthetized with subcutaneous infiltration of 1% lidocaine with epi, and incised with a 11 scalpel, the tissue was extracted with curved scissors and hemostasis achieved. The area was then covered with dry gauze.    3. Pt tolerated well    ASSESSMENT/PLAN:     Diagnoses and all orders for this visit:    Thrombosed external hemorrhoid  -     hydrocortisone 2.5 % cream; Apply topically 2 (two) times daily.        The patient was instructed to:  Hydrocortisone bid   Increase water intake to at least 8-10 glasses of water per day.  Take a daily fiber supplement (Konsyl, Benefiber, Metamucil) and increase dietary intake to 20-30 grams/day.  Avoid straining or spending >5min/event with bowel movements.  Follow-up in clinic prn.      COLIN Thomas  Colon and Rectal Surgery        "

## 2023-09-19 ENCOUNTER — PATIENT MESSAGE (OUTPATIENT)
Dept: OTHER | Facility: OTHER | Age: 38
End: 2023-09-19
Payer: COMMERCIAL

## 2023-10-03 ENCOUNTER — PATIENT MESSAGE (OUTPATIENT)
Dept: OTHER | Facility: OTHER | Age: 38
End: 2023-10-03
Payer: COMMERCIAL

## 2023-10-04 ENCOUNTER — ROUTINE PRENATAL (OUTPATIENT)
Dept: OBSTETRICS AND GYNECOLOGY | Facility: CLINIC | Age: 38
End: 2023-10-04
Payer: COMMERCIAL

## 2023-10-04 VITALS
WEIGHT: 208.13 LBS | SYSTOLIC BLOOD PRESSURE: 124 MMHG | BODY MASS INDEX: 29.03 KG/M2 | DIASTOLIC BLOOD PRESSURE: 67 MMHG

## 2023-10-04 DIAGNOSIS — O09.92 SUPERVISION OF HIGH RISK PREGNANCY IN SECOND TRIMESTER: Primary | ICD-10-CM

## 2023-10-04 PROCEDURE — 0502F PR SUBSEQUENT PRENATAL CARE: ICD-10-PCS | Mod: CPTII,S$GLB,, | Performed by: ADVANCED PRACTICE MIDWIFE

## 2023-10-04 PROCEDURE — 99999 PR PBB SHADOW E&M-EST. PATIENT-LVL III: ICD-10-PCS | Mod: PBBFAC,,, | Performed by: ADVANCED PRACTICE MIDWIFE

## 2023-10-04 PROCEDURE — 99999 PR PBB SHADOW E&M-EST. PATIENT-LVL III: CPT | Mod: PBBFAC,,, | Performed by: ADVANCED PRACTICE MIDWIFE

## 2023-10-04 PROCEDURE — 0502F SUBSEQUENT PRENATAL CARE: CPT | Mod: CPTII,S$GLB,, | Performed by: ADVANCED PRACTICE MIDWIFE

## 2023-10-04 NOTE — PROGRESS NOTES
Pt in for routine prenatal visit- pt is requesting consult with Dr. De La Fuente sec to hx of myomectomy and  delivery.  has concerns r/t repeat surgeries. Consult scheduled.    I have seen the patient, reviewed the Resident's assessment, plan and progress note. I have personally interviewed and examined the patient at bedside and: agree with the findings.     Cynthia Gómez, PEDROM  Obstetrics

## 2023-10-04 NOTE — PROGRESS NOTES
Pregnancy dating, labs, ultrasound reports, prenatal testing, and problem list; prior records and results; and available outside records were reviewed and updated in EMR.  Pertinent findings were noted below.    Reason for Visit   Routine Prenatal Visit    HPI   38 y.o., at 28w1d by Estimated Date of Delivery: 23    Patient is doing well today. She denies any contractions, vaginal bleeding, or loss of fluid. She reports good fetal movement.    Contractions: No   Bleeding: No   Loss of fluid: No   Fetal movement: Yes   Nausea: No   Vomiting: No   Headache: No     Exam   /67   Wt 94.4 kg (208 lb 1.8 oz)   BMI 29.03 kg/m²     TW.3kg  GENERAL: No acute distress  ABD: Gravid    Assessment and Plan   There are no diagnoses linked to this encounter.     TDAP offered today, but patient declines at this time   Will reevaluate TDAP vaccine at next visit     labor, Pain and bleeding, preE and fetal movement count precautions given  Follow-up: 2 weeks    Clau Mar MD  Obstetrics and Gynecology - PGY1

## 2023-10-08 ENCOUNTER — PATIENT MESSAGE (OUTPATIENT)
Dept: OBSTETRICS AND GYNECOLOGY | Facility: CLINIC | Age: 38
End: 2023-10-08
Payer: COMMERCIAL

## 2023-10-16 ENCOUNTER — ROUTINE PRENATAL (OUTPATIENT)
Dept: OBSTETRICS AND GYNECOLOGY | Facility: CLINIC | Age: 38
End: 2023-10-16
Payer: COMMERCIAL

## 2023-10-16 ENCOUNTER — CLINICAL SUPPORT (OUTPATIENT)
Dept: OBSTETRICS AND GYNECOLOGY | Facility: CLINIC | Age: 38
End: 2023-10-16
Payer: COMMERCIAL

## 2023-10-16 VITALS
BODY MASS INDEX: 29.49 KG/M2 | SYSTOLIC BLOOD PRESSURE: 106 MMHG | WEIGHT: 211.44 LBS | DIASTOLIC BLOOD PRESSURE: 62 MMHG

## 2023-10-16 DIAGNOSIS — O09.523 MULTIGRAVIDA OF ADVANCED MATERNAL AGE IN THIRD TRIMESTER: Primary | ICD-10-CM

## 2023-10-16 DIAGNOSIS — O34.219 HISTORY OF CESAREAN SECTION COMPLICATING PREGNANCY: ICD-10-CM

## 2023-10-16 DIAGNOSIS — Z23 NEED FOR TDAP VACCINATION: Primary | ICD-10-CM

## 2023-10-16 DIAGNOSIS — Z98.890 HISTORY OF MYOMECTOMY: ICD-10-CM

## 2023-10-16 PROCEDURE — 90715 TDAP VACCINE GREATER THAN OR EQUAL TO 7YO IM: ICD-10-PCS | Mod: S$GLB,,,

## 2023-10-16 PROCEDURE — 99999 PR PBB SHADOW E&M-EST. PATIENT-LVL I: CPT | Mod: PBBFAC,,,

## 2023-10-16 PROCEDURE — 99999 PR PBB SHADOW E&M-EST. PATIENT-LVL I: ICD-10-PCS | Mod: PBBFAC,,,

## 2023-10-16 PROCEDURE — 99999 PR PBB SHADOW E&M-EST. PATIENT-LVL III: CPT | Mod: PBBFAC,,, | Performed by: OBSTETRICS & GYNECOLOGY

## 2023-10-16 PROCEDURE — 0502F PR SUBSEQUENT PRENATAL CARE: ICD-10-PCS | Mod: CPTII,S$GLB,, | Performed by: OBSTETRICS & GYNECOLOGY

## 2023-10-16 PROCEDURE — 90471 TDAP VACCINE GREATER THAN OR EQUAL TO 7YO IM: ICD-10-PCS | Mod: S$GLB,,,

## 2023-10-16 PROCEDURE — 0502F SUBSEQUENT PRENATAL CARE: CPT | Mod: CPTII,S$GLB,, | Performed by: OBSTETRICS & GYNECOLOGY

## 2023-10-16 PROCEDURE — 90715 TDAP VACCINE 7 YRS/> IM: CPT | Mod: S$GLB,,,

## 2023-10-16 PROCEDURE — 90471 IMMUNIZATION ADMIN: CPT | Mod: S$GLB,,,

## 2023-10-16 PROCEDURE — 99999 PR PBB SHADOW E&M-EST. PATIENT-LVL III: ICD-10-PCS | Mod: PBBFAC,,, | Performed by: OBSTETRICS & GYNECOLOGY

## 2023-10-16 NOTE — PROGRESS NOTES
Consultation visit:    Patient considering establishing care with me.  Patient has records in hand from myomectomy performed in [place] by advanced minimally invasive surgeon.  Patient is undergone subsequent primary  delivery at approximate 37 weeks and is now pregnant for her 2nd baby.  Patient has been receiving care from Gabriel Copeland and [facility] and also receiving care from her cousin in [place] (Dr. Jason Rushing).  Patient's 1st  was performed in [place].  Patient with her initial sonogram in [place] - not done by technician with no measurements and a verbal of possibly 7 weeks gestation.  However patient did not report any measurements or crown-rump length at this ultrasound and was a very loose interpretation of her gestational age.  Patient's last menstrual period due date is 2023.  This was date being used by her provider in [place].  I will review with [facility] dating criteria to determine ultimate delivery date ( recommended approximate 37-38 weeks gestation) due to prior myomectomy (large fundal fibroid removed).    Routine obstetric care/Tdap recommendation reviewed today.  If patient desires to transition care (as is her stated intention), recommend follow-up in 2 weeks.    Justin De La Fuente IV, MD

## 2023-10-17 ENCOUNTER — PATIENT MESSAGE (OUTPATIENT)
Dept: OTHER | Facility: OTHER | Age: 38
End: 2023-10-17
Payer: COMMERCIAL

## 2023-10-19 NOTE — PROGRESS NOTES
Lab Documentation:    Order Type: Written Order placed in [facility]    Patient in for lab visit only per provider treatment plan.

## 2023-10-31 ENCOUNTER — PATIENT MESSAGE (OUTPATIENT)
Dept: OTHER | Facility: OTHER | Age: 38
End: 2023-10-31
Payer: COMMERCIAL

## 2023-11-01 ENCOUNTER — PROCEDURE VISIT (OUTPATIENT)
Dept: MATERNAL FETAL MEDICINE | Facility: CLINIC | Age: 38
End: 2023-11-01
Payer: COMMERCIAL

## 2023-11-01 DIAGNOSIS — Z36.89 ENCOUNTER FOR ULTRASOUND TO CHECK FETAL GROWTH: ICD-10-CM

## 2023-11-01 PROCEDURE — 76816 OB US FOLLOW-UP PER FETUS: CPT | Mod: S$GLB,,, | Performed by: OBSTETRICS & GYNECOLOGY

## 2023-11-01 PROCEDURE — 76816 US MFM PROCEDURE (VIEWPOINT): ICD-10-PCS | Mod: S$GLB,,, | Performed by: OBSTETRICS & GYNECOLOGY

## 2023-11-02 ENCOUNTER — ROUTINE PRENATAL (OUTPATIENT)
Dept: OBSTETRICS AND GYNECOLOGY | Facility: CLINIC | Age: 38
End: 2023-11-02
Payer: COMMERCIAL

## 2023-11-02 VITALS
BODY MASS INDEX: 29.95 KG/M2 | SYSTOLIC BLOOD PRESSURE: 102 MMHG | WEIGHT: 214.75 LBS | DIASTOLIC BLOOD PRESSURE: 70 MMHG

## 2023-11-02 DIAGNOSIS — Z98.890 S/P MYOMECTOMY: ICD-10-CM

## 2023-11-02 DIAGNOSIS — Z23 NEED FOR INFLUENZA VACCINATION: ICD-10-CM

## 2023-11-02 DIAGNOSIS — O09.523 AMA (ADVANCED MATERNAL AGE) MULTIGRAVIDA 35+, THIRD TRIMESTER: Primary | ICD-10-CM

## 2023-11-02 PROCEDURE — 99999 PR PBB SHADOW E&M-EST. PATIENT-LVL III: ICD-10-PCS | Mod: PBBFAC,,, | Performed by: OBSTETRICS & GYNECOLOGY

## 2023-11-02 PROCEDURE — 99999 PR PBB SHADOW E&M-EST. PATIENT-LVL III: CPT | Mod: PBBFAC,,, | Performed by: OBSTETRICS & GYNECOLOGY

## 2023-11-02 PROCEDURE — 0502F SUBSEQUENT PRENATAL CARE: CPT | Mod: CPTII,S$GLB,, | Performed by: OBSTETRICS & GYNECOLOGY

## 2023-11-02 PROCEDURE — 0502F PR SUBSEQUENT PRENATAL CARE: ICD-10-PCS | Mod: CPTII,S$GLB,, | Performed by: OBSTETRICS & GYNECOLOGY

## 2023-11-02 NOTE — PROGRESS NOTES
Continue routine obstetric care.  Dating criteria reviewed.  Will use 2023 as her due date per sonogram criteria.    Due to history of significant myomectomy/entire posterior fundal repair post myomectomy, will proceed with  delivery approximate 37 weeks' gestation.  Patient verbalized understanding of this recommendation discussion.  Will discuss timing of  delivery with MFM.     labor signs and symptoms reviewed today.  Patient instructed on daily fetal movement counts.  Patient instructed to obtain labs from [place].  If not, will order baseline labs with third-trimester labs.

## 2023-11-03 ENCOUNTER — LAB VISIT (OUTPATIENT)
Dept: LAB | Facility: HOSPITAL | Age: 38
End: 2023-11-03
Attending: OBSTETRICS & GYNECOLOGY
Payer: COMMERCIAL

## 2023-11-03 DIAGNOSIS — O09.523 AMA (ADVANCED MATERNAL AGE) MULTIGRAVIDA 35+, THIRD TRIMESTER: ICD-10-CM

## 2023-11-03 PROCEDURE — 87086 URINE CULTURE/COLONY COUNT: CPT | Performed by: OBSTETRICS & GYNECOLOGY

## 2023-11-05 LAB
BACTERIA UR CULT: NORMAL
BACTERIA UR CULT: NORMAL

## 2023-11-16 ENCOUNTER — PATIENT MESSAGE (OUTPATIENT)
Dept: OBSTETRICS AND GYNECOLOGY | Facility: CLINIC | Age: 38
End: 2023-11-16

## 2023-11-16 ENCOUNTER — ROUTINE PRENATAL (OUTPATIENT)
Dept: OBSTETRICS AND GYNECOLOGY | Facility: CLINIC | Age: 38
End: 2023-11-16
Payer: COMMERCIAL

## 2023-11-16 VITALS
BODY MASS INDEX: 30.01 KG/M2 | WEIGHT: 215.19 LBS | SYSTOLIC BLOOD PRESSURE: 110 MMHG | DIASTOLIC BLOOD PRESSURE: 68 MMHG

## 2023-11-16 DIAGNOSIS — O34.29 PREGNANCY W/ HX OF UTERINE MYOMECTOMY: ICD-10-CM

## 2023-11-16 DIAGNOSIS — O09.523 AMA (ADVANCED MATERNAL AGE) MULTIGRAVIDA 35+, THIRD TRIMESTER: Primary | ICD-10-CM

## 2023-11-16 DIAGNOSIS — O09.93 SUPERVISION OF HIGH RISK PREGNANCY IN THIRD TRIMESTER: ICD-10-CM

## 2023-11-16 PROCEDURE — 99999 PR PBB SHADOW E&M-EST. PATIENT-LVL II: CPT | Mod: PBBFAC,,, | Performed by: OBSTETRICS & GYNECOLOGY

## 2023-11-16 PROCEDURE — 0502F SUBSEQUENT PRENATAL CARE: CPT | Mod: CPTII,S$GLB,, | Performed by: OBSTETRICS & GYNECOLOGY

## 2023-11-16 PROCEDURE — 99999 PR PBB SHADOW E&M-EST. PATIENT-LVL II: ICD-10-PCS | Mod: PBBFAC,,, | Performed by: OBSTETRICS & GYNECOLOGY

## 2023-11-16 PROCEDURE — 0502F PR SUBSEQUENT PRENATAL CARE: ICD-10-PCS | Mod: CPTII,S$GLB,, | Performed by: OBSTETRICS & GYNECOLOGY

## 2023-11-16 NOTE — PROGRESS NOTES
Subjective:  Routine prenatal - 3rd trimester.  Patient has no complaints today.  Good fetal movement reported.    Objective:  See OB flow sheet    Impression/plan:  34 weeks 2 days gestation.   Patient has history of myomectomy.  Repeat  delivery scheduled for 2023 at 7:00 a.m.  Group B beta strep culture and 3rd trimester labs next visit.  Patient instructed on daily fetal movement counts/kick counts.  Labor instructions reviewed with patient today.    Justin De La Fuente IV, MD

## 2023-11-20 ENCOUNTER — CLINICAL SUPPORT (OUTPATIENT)
Dept: REHABILITATION | Facility: OTHER | Age: 38
End: 2023-11-20
Attending: OBSTETRICS & GYNECOLOGY
Payer: COMMERCIAL

## 2023-11-20 DIAGNOSIS — M53.3 SI (SACROILIAC) JOINT DYSFUNCTION: ICD-10-CM

## 2023-11-20 DIAGNOSIS — M54.50 LOW BACK PAIN DURING PREGNANCY IN SECOND TRIMESTER: ICD-10-CM

## 2023-11-20 DIAGNOSIS — O26.893 LOW BACK PAIN DURING PREGNANCY IN THIRD TRIMESTER: Primary | ICD-10-CM

## 2023-11-20 DIAGNOSIS — M54.50 LOW BACK PAIN DURING PREGNANCY IN THIRD TRIMESTER: Primary | ICD-10-CM

## 2023-11-20 DIAGNOSIS — O26.892 LOW BACK PAIN DURING PREGNANCY IN SECOND TRIMESTER: ICD-10-CM

## 2023-11-20 PROCEDURE — 97530 THERAPEUTIC ACTIVITIES: CPT | Mod: PN | Performed by: PHYSICAL THERAPIST

## 2023-11-20 PROCEDURE — 97162 PT EVAL MOD COMPLEX 30 MIN: CPT | Mod: PN | Performed by: PHYSICAL THERAPIST

## 2023-11-20 PROCEDURE — 97140 MANUAL THERAPY 1/> REGIONS: CPT | Mod: PN | Performed by: PHYSICAL THERAPIST
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
## 2023-11-22 NOTE — PROGRESS NOTES
[facility]   Physical Therapy Treatment Note      Name: Sangeeta Paniagua  Clinic Number: [id number]    Therapy Diagnosis:   Encounter Diagnoses   Name Primary?    Low back pain during pregnancy in third trimester Yes    SI (sacroiliac) joint dysfunction      Referring Provider: Sabiha Reece MD    Visit Date: 2023    Physician Orders: PT Eval and Treat   Medical Diagnosis from Referral: O26.892,M54.50 (ICD-10-CM) - Low back pain during pregnancy in second trimester   Evaluation Date: 2023  Authorization Period Expiration: 2024  Plan of Care Expiration: 2024  Progress Note Due: 2023  Visit # / Visits authorized:    FOTO:     PTA Visit #: 0/5     Time In: 14:30  Time Out: 15:15  Total Billable Time: 43 minutes    SUBJECTIVE     Pt reports: her usual back pain. She has been very busy and hasn't had time for stretching/exercise. She deep cleaned the house and is feeling sore.  She was not compliant with home exercise program.  Response to previous treatment: no adverse effect  Functional change: none reported    Pain: moderate  Location: R > L lower back    OBJECTIVE     Objective Measures updated at progress report unless specified.     Treatment     Sangeeta received the treatments listed below:    TrA = transverse abdominis    Therapeutic activities to improve functional performance for 12 minutes -  [x] HEP review/building  [x] Education on return to exercise postpartum  [x] Education on scar desensitization and mobilization for post-op   [x] TrA brace + Pilates ring squeeze between hands + sit to stand from chair with 2 foam pads, 2x10  [x] Lateral step up/down on 6-inch step (R&L), 2x10    Therapeutic exercises to develop strength, endurance and core stabilization for 8 minutes -  [x] Side-stepping with yellow band around knees, 3 minutes  [x] Standing open books with yellow band (R&L), x12  [x] Suitcase carry (10#, R&L),  2x60ft    Neuromuscular re-education activities to develop proprioception, coordination, and motor control for 23 minutes -  [x] Seated on theraball + R/L tilts, anterior/posterior tilts, circles  [x] TrA brace + standing row with green tubing, 2x20  [x] TrA brace + standing shoulder extension with green tubing, 2x20  [x] Seated on theraball + shoulder flexion (2#, R&L), 3x10  [x] Seated on theraball + overhead press (2#, R&L), 3x10  [x] Seated TrA brace + hip adduction isometric with ball between knees, x30  [x] TrA brace + wall push ups, x20      Patient Education and Home Exercises     Education provided: pt prognosis, PT plan of care, scar desensitization, return to exercise postpartum, deep core muscle function    Written Home Exercises Provided: yes. Exercises were reviewed and Sangeeta was able to demonstrate them prior to the end of the session. Sangeeta demonstrated good understanding of the education provided. See EMR under Patient Instructions for exercises provided during therapy sessions.    HEP updates (11/28/23): suitcase carry, lateral step up/down, sit to stand, wall push up, standing shoulder extension, seated clam with band    ASSESSMENT     Sangeeta Paniagua tolerated treatment session well, with no increased symptoms with exercise and improving exercise technique within session. Exercise tolerance limited by pain and core/leg weakness, but she found exercises enjoyable and challenging. Pt requires verbal and tactile cuing for correct exercise performance.     Sangeeta Paniagua will continue to benefit from skilled outpatient physical therapy to address the deficits listed in the problem list box on initial evaluation, provide pt/family education and to maximize pt's level of independence in the home and community environment.     Sangeeta is progressing well towards her goals.   Pt prognosis is Good.   Pt's spiritual, cultural and educational needs considered, and pt agreeable to plan of care and  goals.  Anticipated barriers to physical therapy: impending , advanced pregnancy    Short Term Goals (4 Weeks):   1. Pt will report 20% reduction in pain of the lumbar spine and hips for ease with ADL's. - NOT MET  2. PT will demonstrate improved upright posture with minimal cuing for ease with functional positioning in home and community. - NOT MET  3. Pt will demonstrate improved lumbar spine ROM in all directions by 10% for ease with bending activities. - NOT MET  4. Pt to demonstrate improved functional ability with FOTO score >=56%.  - NOT MET      Long Term Goals (12 Weeks):   1. Pt will report being independent with HEP for maintenance of improvements gained during therapy sessions - NOT MET  2. PT will report 50% reduction of pain of the back and hips for ease with childcare. - NOT MET  3. Pt will demonstrate trunk and extremity strength to >=4+/5 without the provocation of pain for ease with bending and lifting tasks in home - NOT MET  4. Pt will demonstrate appropriate upright posture without external cueing for ease with household chores and work tasks. - NOT MET  5. Pt to demonstrate improved functional ability with FOTO score >=66%. - NOT MET    PLAN     Continue per Plan of Care  Discharge following next session in anticipation of upcoming       Genesis Mcnair, PT, DPT

## 2023-11-28 ENCOUNTER — CLINICAL SUPPORT (OUTPATIENT)
Dept: REHABILITATION | Facility: OTHER | Age: 38
End: 2023-11-28
Payer: COMMERCIAL

## 2023-11-28 DIAGNOSIS — M54.50 LOW BACK PAIN DURING PREGNANCY IN THIRD TRIMESTER: Primary | ICD-10-CM

## 2023-11-28 DIAGNOSIS — M53.3 SI (SACROILIAC) JOINT DYSFUNCTION: ICD-10-CM

## 2023-11-28 DIAGNOSIS — O26.893 LOW BACK PAIN DURING PREGNANCY IN THIRD TRIMESTER: Primary | ICD-10-CM

## 2023-11-28 PROCEDURE — 97110 THERAPEUTIC EXERCISES: CPT | Mod: PN | Performed by: PHYSICAL THERAPIST

## 2023-11-28 PROCEDURE — 97530 THERAPEUTIC ACTIVITIES: CPT | Mod: PN | Performed by: PHYSICAL THERAPIST

## 2023-11-28 PROCEDURE — 97112 NEUROMUSCULAR REEDUCATION: CPT | Mod: PN | Performed by: PHYSICAL THERAPIST

## 2023-11-28 NOTE — PATIENT INSTRUCTIONS
Sit to stand, 2-3 sets of 10  - Gently pull the belly button in towards the spine as you lift out of the chair (use arms on thighs if needed)  *Don't hold your breath      Suitcase carry, 3 rounds of 20-40ft  - Hold weight (5-15#) in one arm as you forward march for 20-40ft  *Keep your body up tall  *Don't hold your breath      Standing shoulder extension with band, 2-3 sets of 15  - Inhale to prepare, relax the belly and pelvic floor  - As you exhale, gently engage the transverse abdominis by drawing the belly button down to the spine  - Pull the theraband down to the hips without moving the rest of the body  - Inhale again to rest and return the arms back up  *Don't hold your breath      Lateral step up/down, 2x15      Seated clam, 2-3 sets of 15      Wall push-up, 2-3 sets of 15  - Stand 2-3 feet away from the wall with hands at the level of your shoulders. Inhale to prepare, relax the belly and pelvic floor  - As you exhale, gently engage the transverse abdominis by drawing the belly button down to the spine.  - Keeping your body straight like a plank of wood, lean in towards the wall and push away  - Inhale again to rest  *Don't hold your breath           ----------------------------------------------    Healing Your  Scar  Any incision disrupts the layers of skin, muscle, and fascia (important connective tissue that ties muscle, skin, and organs together), and working on the scar can help make sure the healing process goes well. The belly is an important area of the body for good lower back and pelvic floor function, so rehabilitating your  scar is important!  Your body heals from an incision in stages. In the first few weeks, your body focuses on closing up the area, making lots of scar tissue. Over the next 1-2 years, your body remodels that scar to reduce any unnecessary tissue. During this time, tiny nerves in the skin are growing back to each other (which can feel sensitive) and the  muscles in the area are building back up.    Scar Desensitization (start at 2-4 weeks postpartum)  Your scar might feel sensitive or tingly, either feeling uncomfortable to touch or to have clothes/sheets touch it. Progressively getting your scar used to being touched can help reduce this feeling.   Start with something very soft, like silk or satin, and gently pass it over the scar. It should be no more than mildly uncomfortable. After a few times, that will likely feel much less sensitive, and you're ready to progress! Work your way up from silk/satin, to a soft cotton T-shirt, to a rougher wash rag (or whatever your have available).   Doing this teaches your nervous system to pay less attention to the scar and should improve the sensitivity over time.    Scar Massage (start at 6-8 weeks postpartum, the scar should be well-healed)  After your scar is more healed, you can start massaging it. This can help further reduce sensitivity and work on the layers of skin, muscle, and fascia trapped under the scar. These layers need to be able to move and glide across each other, so performing some massage with movement can help this process. Scar mobilization has been shown to improve the viscoelasticity (read: stretchiness) and pain threshold of  scars.  Gently massage over the  scar with soap/moisturizer in the shower, or with lotion. It should be no more than mildly uncomfortable, and you should stop if you notice any worsening symptoms or changes with your scar (also, contact your provider). Start with just gentle circles, and progress to more aggressive techniques like pinch & lift. Perform for 3-5 minutes per day, and reduce to every couple days or every couple weeks if you notice improvements.        Raul CARLIN, Yossi MELENDREZ, Kaity CARLIN. Exploring the Effects of Standardized Soft Tissue Mobilization on the Viscoelastic Properties, Pressure Pain Thresholds, and Tactile Pressure Thresholds of the   Section Scar. J Integr Complement Med. 2022;28(4):355-362.      Return to Exercise Postpartum  It's great to start moving following delivery, but it's important to be gentle and respect your body's healing.  For the first 6 weeks following delivery, your priority should be healing and getting to know your . Your uterus is still shrinking down, you're likely not getting much sleep, and your hormones are settling into their postpartum state. Your abdominal wall and pelvic floor muscles have been working hard for the past few months and are likely not very strong right now. If you are breast-feeding, this is a crucial time for developing your milk supply. This is not the time for losing baby weight or getting back to an intense fitness regimen. Doing too much too soon can result in soreness and pelvic floor muscle irritation/dysfunction.  Consider the 5-5-5 rule, which recommends your first 5 postpartum days in bed, then 5 days on the bed, then 5 days around the bed.   There are lots of different guidelines regarding return to activity and exercise postpartum, but each person and birth will be different. The following are some exercises that might be appropriate for you to perform in the weeks following delivery. Depending on your strength level prior to delivery, method of delivery, and healing times, you may be able to progress to more advanced exercise. Discontinue exercises that cause increased pain or pelvic pressure - you may need a little more time before progressing to them. Also, if you note an increase in vaginal bleeding (lochia), you have likely added too much activity/exercise. When in doubt, see a physical therapist.    Diaphragmatic breathing - Relax your belly and pelvic floor, then breathe into the sides of your ribs and/or deep into the belly.       Transverse abdominis bracing - Inhale to prepare, then as your exhale, gently pull the belly button up and in towards your spine.        Pelvic floor contractions - Squeeze your pelvic floor GENTLY and focus on full relaxation between reps. Skip this one if you're experiencing pelvic pain.      Cat/cow - On all fours, inhale as you stretch your belly and til your head up to the ceiling. Then exhale to round your spine up to the ceiling while letting your head hang. Move in a comfortable range. Be gentle after a .      Bent knee fall outs - Lie on your back with knees bent, and slowly drop one leg to the side without letting your trunk move. Repeat with the other side.      Clams - Lying on your side with your top hip tilted way forward, lift your top knee an inch while keeping your trunk very still      Walking - Move at an easy pace, start with 5-10 minutes and increase up to 30 as tolerated (aim to increase mileage no more than 10% each week). Wearing your baby or pushing a stroller may add difficulty/pressure on the pelvic floor.         Ciera Murray et al. Exercise after pregnancy. [place] journal of general practice vol. 51,3 (): 117-121. doi:10.19836/RKKU-15-  Kassy De La Torre et al. Maximizing Recovery in the Postpartum Period: A Timeline for Rehabilitation from Pregnancy through Return to Sport. International journal of sports physical therapy vol. 17,6 3769-1346. 1 Oct. 2022, doi:10.07451/001c.05329

## 2023-11-30 ENCOUNTER — LAB VISIT (OUTPATIENT)
Dept: LAB | Facility: OTHER | Age: 38
End: 2023-11-30
Attending: OBSTETRICS & GYNECOLOGY
Payer: COMMERCIAL

## 2023-11-30 ENCOUNTER — ROUTINE PRENATAL (OUTPATIENT)
Dept: OBSTETRICS AND GYNECOLOGY | Facility: CLINIC | Age: 38
End: 2023-11-30
Payer: COMMERCIAL

## 2023-11-30 VITALS
SYSTOLIC BLOOD PRESSURE: 110 MMHG | DIASTOLIC BLOOD PRESSURE: 64 MMHG | BODY MASS INDEX: 30.23 KG/M2 | WEIGHT: 216.69 LBS

## 2023-11-30 DIAGNOSIS — O09.93 SUPERVISION OF HIGH RISK PREGNANCY IN THIRD TRIMESTER: ICD-10-CM

## 2023-11-30 DIAGNOSIS — Z3A.36 36 WEEKS GESTATION OF PREGNANCY: Primary | ICD-10-CM

## 2023-11-30 DIAGNOSIS — O09.523 AMA (ADVANCED MATERNAL AGE) MULTIGRAVIDA 35+, THIRD TRIMESTER: ICD-10-CM

## 2023-11-30 LAB
ABO + RH BLD: NORMAL
BASOPHILS # BLD AUTO: 0.03 K/UL (ref 0–0.2)
BASOPHILS NFR BLD: 0.3 % (ref 0–1.9)
BLD GP AB SCN CELLS X3 SERPL QL: NORMAL
DIFFERENTIAL METHOD: ABNORMAL
EOSINOPHIL # BLD AUTO: 0 K/UL (ref 0–0.5)
EOSINOPHIL NFR BLD: 0.3 % (ref 0–8)
ERYTHROCYTE [DISTWIDTH] IN BLOOD BY AUTOMATED COUNT: 15.7 % (ref 11.5–14.5)
HCT VFR BLD AUTO: 31.6 % (ref 37–48.5)
HGB BLD-MCNC: 9.7 G/DL (ref 12–16)
HIV 1+2 AB+HIV1 P24 AG SERPL QL IA: NORMAL
IMM GRANULOCYTES # BLD AUTO: 0.07 K/UL (ref 0–0.04)
IMM GRANULOCYTES NFR BLD AUTO: 0.8 % (ref 0–0.5)
LYMPHOCYTES # BLD AUTO: 2.4 K/UL (ref 1–4.8)
LYMPHOCYTES NFR BLD: 26.8 % (ref 18–48)
MCH RBC QN AUTO: 24.6 PG (ref 27–31)
MCHC RBC AUTO-ENTMCNC: 30.7 G/DL (ref 32–36)
MCV RBC AUTO: 80 FL (ref 82–98)
MONOCYTES # BLD AUTO: 0.6 K/UL (ref 0.3–1)
MONOCYTES NFR BLD: 7.1 % (ref 4–15)
NEUTROPHILS # BLD AUTO: 5.8 K/UL (ref 1.8–7.7)
NEUTROPHILS NFR BLD: 64.7 % (ref 38–73)
NRBC BLD-RTO: 0 /100 WBC
PLATELET # BLD AUTO: 216 K/UL (ref 150–450)
PMV BLD AUTO: 10 FL (ref 9.2–12.9)
RBC # BLD AUTO: 3.95 M/UL (ref 4–5.4)
SPECIMEN OUTDATE: NORMAL
WBC # BLD AUTO: 9.02 K/UL (ref 3.9–12.7)

## 2023-11-30 PROCEDURE — 86901 BLOOD TYPING SEROLOGIC RH(D): CPT | Performed by: OBSTETRICS & GYNECOLOGY

## 2023-11-30 PROCEDURE — 0502F SUBSEQUENT PRENATAL CARE: CPT | Mod: CPTII,S$GLB,, | Performed by: FAMILY MEDICINE

## 2023-11-30 PROCEDURE — 87081 CULTURE SCREEN ONLY: CPT | Performed by: FAMILY MEDICINE

## 2023-11-30 PROCEDURE — 36415 COLL VENOUS BLD VENIPUNCTURE: CPT | Performed by: OBSTETRICS & GYNECOLOGY

## 2023-11-30 PROCEDURE — 99999 PR PBB SHADOW E&M-EST. PATIENT-LVL III: CPT | Mod: PBBFAC,,, | Performed by: FAMILY MEDICINE

## 2023-11-30 PROCEDURE — 0502F PR SUBSEQUENT PRENATAL CARE: ICD-10-PCS | Mod: CPTII,S$GLB,, | Performed by: FAMILY MEDICINE

## 2023-11-30 PROCEDURE — 99999 PR PBB SHADOW E&M-EST. PATIENT-LVL III: ICD-10-PCS | Mod: PBBFAC,,, | Performed by: FAMILY MEDICINE

## 2023-11-30 PROCEDURE — 87389 HIV-1 AG W/HIV-1&-2 AB AG IA: CPT | Performed by: OBSTETRICS & GYNECOLOGY

## 2023-11-30 PROCEDURE — 85025 COMPLETE CBC W/AUTO DIFF WBC: CPT | Performed by: OBSTETRICS & GYNECOLOGY

## 2023-11-30 PROCEDURE — 86592 SYPHILIS TEST NON-TREP QUAL: CPT | Performed by: OBSTETRICS & GYNECOLOGY

## 2023-11-30 NOTE — PROGRESS NOTES
Here for routine OB appt at 36w2d, with no complaints.  Reports good FM.  Denies LOF, denies VB, denies regular contractions, infrequent BHC. No HAs. 3T labs and GBS done today. Hibiclens and csection sheet given.   Reviewed warning signs of Labor and Preeclampsia.  Daily FM counts reinforced.  F/U scheduled 1 week

## 2023-11-30 NOTE — PATIENT INSTRUCTIONS
[facility] PHONE NUMBER, [phone] (OPEN 24/7, LOCATED ON 6TH FLOOR OF HOSPITAL)  [address] PHONE NUMBER, [phone] (OPEN MON-FRI, 8a-5p)

## 2023-12-01 LAB — RPR SER QL: NORMAL

## 2023-12-04 ENCOUNTER — ROUTINE PRENATAL (OUTPATIENT)
Dept: OBSTETRICS AND GYNECOLOGY | Facility: CLINIC | Age: 38
End: 2023-12-04
Payer: COMMERCIAL

## 2023-12-04 VITALS
SYSTOLIC BLOOD PRESSURE: 110 MMHG | DIASTOLIC BLOOD PRESSURE: 64 MMHG | BODY MASS INDEX: 30.44 KG/M2 | WEIGHT: 218.25 LBS

## 2023-12-04 DIAGNOSIS — O09.523 MULTIGRAVIDA OF ADVANCED MATERNAL AGE IN THIRD TRIMESTER: Primary | ICD-10-CM

## 2023-12-04 DIAGNOSIS — O34.29 PREGNANCY WITH HISTORY OF UTERINE MYOMECTOMY: ICD-10-CM

## 2023-12-04 LAB — BACTERIA SPEC AEROBE CULT: NORMAL

## 2023-12-04 PROCEDURE — 99999 PR PBB SHADOW E&M-EST. PATIENT-LVL II: CPT | Mod: PBBFAC,,, | Performed by: OBSTETRICS & GYNECOLOGY

## 2023-12-04 PROCEDURE — 0502F SUBSEQUENT PRENATAL CARE: CPT | Mod: CPTII,S$GLB,, | Performed by: OBSTETRICS & GYNECOLOGY

## 2023-12-04 PROCEDURE — 0502F PR SUBSEQUENT PRENATAL CARE: ICD-10-PCS | Mod: CPTII,S$GLB,, | Performed by: OBSTETRICS & GYNECOLOGY

## 2023-12-04 PROCEDURE — 99999 PR PBB SHADOW E&M-EST. PATIENT-LVL II: ICD-10-PCS | Mod: PBBFAC,,, | Performed by: OBSTETRICS & GYNECOLOGY

## 2023-12-04 NOTE — PROGRESS NOTES
S:  No complaints today.  Active fetus reported.  Patient denies ruptured membranes, vaginal bleeding, or regular contractions.  O:  See flow sheet    A/P:  36w6d pregnancy-advanced maternal age  History of myomectomy    Plan repeat  delivery 2023 at 37+ weeks gestation.  Patient instructed to continue daily fetal movement counts until delivery.    Justin De La Fuente IV, MD

## 2023-12-06 ENCOUNTER — HOSPITAL ENCOUNTER (INPATIENT)
Facility: OTHER | Age: 38
LOS: 3 days | Discharge: HOME | End: 2023-12-09
Attending: OBSTETRICS & GYNECOLOGY | Admitting: OBSTETRICS & GYNECOLOGY
Payer: COMMERCIAL

## 2023-12-06 ENCOUNTER — ANESTHESIA EVENT (OUTPATIENT)
Dept: OBSTETRICS AND GYNECOLOGY | Facility: OTHER | Age: 38
End: 2023-12-06
Payer: COMMERCIAL

## 2023-12-06 ENCOUNTER — ANESTHESIA (OUTPATIENT)
Dept: OBSTETRICS AND GYNECOLOGY | Facility: OTHER | Age: 38
End: 2023-12-06
Payer: COMMERCIAL

## 2023-12-06 DIAGNOSIS — O09.523 MULTIGRAVIDA OF ADVANCED MATERNAL AGE IN THIRD TRIMESTER: ICD-10-CM

## 2023-12-06 DIAGNOSIS — Z3A.37 37 WEEKS GESTATION OF PREGNANCY: ICD-10-CM

## 2023-12-06 DIAGNOSIS — O34.219 HISTORY OF CESAREAN SECTION COMPLICATING PREGNANCY: Primary | ICD-10-CM

## 2023-12-06 LAB
ABO + RH BLD: NORMAL
APTT PPP: 24.2 SEC (ref 21–32)
BASOPHILS # BLD AUTO: 0.03 K/UL (ref 0–0.2)
BASOPHILS # BLD AUTO: 0.03 K/UL (ref 0–0.2)
BASOPHILS NFR BLD: 0.2 % (ref 0–1.9)
BASOPHILS NFR BLD: 0.3 % (ref 0–1.9)
BLD GP AB SCN CELLS X3 SERPL QL: NORMAL
DIFFERENTIAL METHOD: ABNORMAL
DIFFERENTIAL METHOD: ABNORMAL
EOSINOPHIL # BLD AUTO: 0 K/UL (ref 0–0.5)
EOSINOPHIL # BLD AUTO: 0 K/UL (ref 0–0.5)
EOSINOPHIL NFR BLD: 0.1 % (ref 0–8)
EOSINOPHIL NFR BLD: 0.4 % (ref 0–8)
ERYTHROCYTE [DISTWIDTH] IN BLOOD BY AUTOMATED COUNT: 15.9 % (ref 11.5–14.5)
ERYTHROCYTE [DISTWIDTH] IN BLOOD BY AUTOMATED COUNT: 15.9 % (ref 11.5–14.5)
FIBRINOGEN PPP-MCNC: 309 MG/DL (ref 182–400)
HCT VFR BLD AUTO: 30.8 % (ref 37–48.5)
HCT VFR BLD AUTO: 31.6 % (ref 37–48.5)
HGB BLD-MCNC: 9.4 G/DL (ref 12–16)
HGB BLD-MCNC: 9.9 G/DL (ref 12–16)
IMM GRANULOCYTES # BLD AUTO: 0.08 K/UL (ref 0–0.04)
IMM GRANULOCYTES # BLD AUTO: 0.16 K/UL (ref 0–0.04)
IMM GRANULOCYTES NFR BLD AUTO: 0.9 % (ref 0–0.5)
IMM GRANULOCYTES NFR BLD AUTO: 1.2 % (ref 0–0.5)
INR PPP: 0.9 (ref 0.8–1.2)
LYMPHOCYTES # BLD AUTO: 1.9 K/UL (ref 1–4.8)
LYMPHOCYTES # BLD AUTO: 3 K/UL (ref 1–4.8)
LYMPHOCYTES NFR BLD: 13.6 % (ref 18–48)
LYMPHOCYTES NFR BLD: 31.6 % (ref 18–48)
MCH RBC QN AUTO: 24.1 PG (ref 27–31)
MCH RBC QN AUTO: 24.6 PG (ref 27–31)
MCHC RBC AUTO-ENTMCNC: 30.5 G/DL (ref 32–36)
MCHC RBC AUTO-ENTMCNC: 31.3 G/DL (ref 32–36)
MCV RBC AUTO: 78 FL (ref 82–98)
MCV RBC AUTO: 79 FL (ref 82–98)
MONOCYTES # BLD AUTO: 0.4 K/UL (ref 0.3–1)
MONOCYTES # BLD AUTO: 0.8 K/UL (ref 0.3–1)
MONOCYTES NFR BLD: 2.9 % (ref 4–15)
MONOCYTES NFR BLD: 8.4 % (ref 4–15)
NEUTROPHILS # BLD AUTO: 11.2 K/UL (ref 1.8–7.7)
NEUTROPHILS # BLD AUTO: 5.5 K/UL (ref 1.8–7.7)
NEUTROPHILS NFR BLD: 58.4 % (ref 38–73)
NEUTROPHILS NFR BLD: 82 % (ref 38–73)
NRBC BLD-RTO: 0 /100 WBC
NRBC BLD-RTO: 0 /100 WBC
PLATELET # BLD AUTO: 216 K/UL (ref 150–450)
PLATELET # BLD AUTO: 222 K/UL (ref 150–450)
PMV BLD AUTO: 10.5 FL (ref 9.2–12.9)
PMV BLD AUTO: 10.7 FL (ref 9.2–12.9)
PROTHROMBIN TIME: 9.9 SEC (ref 9–12.5)
RBC # BLD AUTO: 3.9 M/UL (ref 4–5.4)
RBC # BLD AUTO: 4.03 M/UL (ref 4–5.4)
SPECIMEN OUTDATE: NORMAL
WBC # BLD AUTO: 13.69 K/UL (ref 3.9–12.7)
WBC # BLD AUTO: 9.33 K/UL (ref 3.9–12.7)

## 2023-12-06 PROCEDURE — 59510 CESAREAN DELIVERY: CPT | Mod: AA,,, | Performed by: ANESTHESIOLOGY

## 2023-12-06 PROCEDURE — 86920 COMPATIBILITY TEST SPIN: CPT | Performed by: GENERAL PRACTICE

## 2023-12-06 PROCEDURE — 25000003 PHARM REV CODE 250

## 2023-12-06 PROCEDURE — 59510 CESAREAN DELIVERY: CPT | Mod: AT,,, | Performed by: OBSTETRICS & GYNECOLOGY

## 2023-12-06 PROCEDURE — 71000039 HC RECOVERY, EACH ADD'L HOUR: Performed by: OBSTETRICS & GYNECOLOGY

## 2023-12-06 PROCEDURE — 85025 COMPLETE CBC W/AUTO DIFF WBC: CPT | Mod: 91 | Performed by: STUDENT IN AN ORGANIZED HEALTH CARE EDUCATION/TRAINING PROGRAM

## 2023-12-06 PROCEDURE — 85384 FIBRINOGEN ACTIVITY: CPT | Performed by: STUDENT IN AN ORGANIZED HEALTH CARE EDUCATION/TRAINING PROGRAM

## 2023-12-06 PROCEDURE — 27200688 HC TRAY, SPINAL-HYPER/ ISOBARIC: Performed by: ANESTHESIOLOGY

## 2023-12-06 PROCEDURE — 63600175 PHARM REV CODE 636 W HCPCS: Performed by: ANESTHESIOLOGY

## 2023-12-06 PROCEDURE — 25000003 PHARM REV CODE 250: Performed by: STUDENT IN AN ORGANIZED HEALTH CARE EDUCATION/TRAINING PROGRAM

## 2023-12-06 PROCEDURE — 63600175 PHARM REV CODE 636 W HCPCS

## 2023-12-06 PROCEDURE — 37000008 HC ANESTHESIA 1ST 15 MINUTES: Performed by: OBSTETRICS & GYNECOLOGY

## 2023-12-06 PROCEDURE — 59510 PRA FULL ROUT OBSTE CARE,CESAREAN DELIV: ICD-10-PCS | Mod: AA,,, | Performed by: ANESTHESIOLOGY

## 2023-12-06 PROCEDURE — 37000009 HC ANESTHESIA EA ADD 15 MINS: Performed by: OBSTETRICS & GYNECOLOGY

## 2023-12-06 PROCEDURE — 25000003 PHARM REV CODE 250: Performed by: ANESTHESIOLOGY

## 2023-12-06 PROCEDURE — 85610 PROTHROMBIN TIME: CPT | Performed by: STUDENT IN AN ORGANIZED HEALTH CARE EDUCATION/TRAINING PROGRAM

## 2023-12-06 PROCEDURE — 63600175 PHARM REV CODE 636 W HCPCS: Performed by: GENERAL PRACTICE

## 2023-12-06 PROCEDURE — 71000033 HC RECOVERY, INTIAL HOUR: Performed by: OBSTETRICS & GYNECOLOGY

## 2023-12-06 PROCEDURE — 59510 PR FULL ROUT OBSTE CARE,CESAREAN DELIV: ICD-10-PCS | Mod: AT,,, | Performed by: OBSTETRICS & GYNECOLOGY

## 2023-12-06 PROCEDURE — 11000001 HC ACUTE MED/SURG PRIVATE ROOM

## 2023-12-06 PROCEDURE — 25000003 PHARM REV CODE 250: Performed by: GENERAL PRACTICE

## 2023-12-06 PROCEDURE — 36004724 HC OB OR TIME LEV III - 1ST 15 MIN: Performed by: OBSTETRICS & GYNECOLOGY

## 2023-12-06 PROCEDURE — 36004725 HC OB OR TIME LEV III - EA ADD 15 MIN: Performed by: OBSTETRICS & GYNECOLOGY

## 2023-12-06 PROCEDURE — 86850 RBC ANTIBODY SCREEN: CPT | Performed by: OBSTETRICS & GYNECOLOGY

## 2023-12-06 PROCEDURE — 85730 THROMBOPLASTIN TIME PARTIAL: CPT | Performed by: STUDENT IN AN ORGANIZED HEALTH CARE EDUCATION/TRAINING PROGRAM

## 2023-12-06 PROCEDURE — 36415 COLL VENOUS BLD VENIPUNCTURE: CPT | Performed by: OBSTETRICS & GYNECOLOGY

## 2023-12-06 PROCEDURE — 85025 COMPLETE CBC W/AUTO DIFF WBC: CPT | Performed by: OBSTETRICS & GYNECOLOGY

## 2023-12-06 RX ORDER — SODIUM CHLORIDE, SODIUM LACTATE, POTASSIUM CHLORIDE, CALCIUM CHLORIDE 600; 310; 30; 20 MG/100ML; MG/100ML; MG/100ML; MG/100ML
INJECTION, SOLUTION INTRAVENOUS CONTINUOUS PRN
Status: DISCONTINUED | OUTPATIENT
Start: 2023-12-06 | End: 2023-12-06

## 2023-12-06 RX ORDER — PROCHLORPERAZINE EDISYLATE 5 MG/ML
5 INJECTION INTRAMUSCULAR; INTRAVENOUS EVERY 6 HOURS PRN
Status: DISCONTINUED | OUTPATIENT
Start: 2023-12-06 | End: 2023-12-09 | Stop reason: HOSPADM

## 2023-12-06 RX ORDER — OXYTOCIN 10 [USP'U]/ML
INJECTION, SOLUTION INTRAMUSCULAR; INTRAVENOUS
Status: DISCONTINUED | OUTPATIENT
Start: 2023-12-06 | End: 2023-12-06

## 2023-12-06 RX ORDER — OXYCODONE HYDROCHLORIDE 5 MG/1
5 TABLET ORAL EVERY 4 HOURS PRN
Status: DISCONTINUED | OUTPATIENT
Start: 2023-12-06 | End: 2023-12-09 | Stop reason: HOSPADM

## 2023-12-06 RX ORDER — DIPHENHYDRAMINE HYDROCHLORIDE 50 MG/ML
12.5 INJECTION INTRAMUSCULAR; INTRAVENOUS
Status: DISCONTINUED | OUTPATIENT
Start: 2023-12-06 | End: 2023-12-09 | Stop reason: HOSPADM

## 2023-12-06 RX ORDER — DOCUSATE SODIUM 100 MG/1
200 CAPSULE, LIQUID FILLED ORAL 2 TIMES DAILY
Status: DISCONTINUED | OUTPATIENT
Start: 2023-12-06 | End: 2023-12-09 | Stop reason: HOSPADM

## 2023-12-06 RX ORDER — OXYCODONE HYDROCHLORIDE 10 MG/1
10 TABLET ORAL EVERY 4 HOURS PRN
Status: DISCONTINUED | OUTPATIENT
Start: 2023-12-06 | End: 2023-12-09 | Stop reason: HOSPADM

## 2023-12-06 RX ORDER — ACETAMINOPHEN 325 MG/1
650 TABLET ORAL
Status: DISCONTINUED | OUTPATIENT
Start: 2023-12-06 | End: 2023-12-09 | Stop reason: HOSPADM

## 2023-12-06 RX ORDER — SODIUM CITRATE AND CITRIC ACID MONOHYDRATE 334; 500 MG/5ML; MG/5ML
30 SOLUTION ORAL
Status: COMPLETED | OUTPATIENT
Start: 2023-12-06 | End: 2023-12-06

## 2023-12-06 RX ORDER — OXYTOCIN/RINGER'S LACTATE 30/500 ML
PLASTIC BAG, INJECTION (ML) INTRAVENOUS
Status: DISCONTINUED | OUTPATIENT
Start: 2023-12-06 | End: 2023-12-06

## 2023-12-06 RX ORDER — BUPIVACAINE HYDROCHLORIDE 7.5 MG/ML
INJECTION, SOLUTION INTRASPINAL
Status: DISCONTINUED | OUTPATIENT
Start: 2023-12-06 | End: 2023-12-06

## 2023-12-06 RX ORDER — MISOPROSTOL 200 UG/1
800 TABLET ORAL ONCE AS NEEDED
Status: COMPLETED | OUTPATIENT
Start: 2023-12-06 | End: 2023-12-06

## 2023-12-06 RX ORDER — HYDROCORTISONE 25 MG/G
CREAM TOPICAL 3 TIMES DAILY PRN
Status: DISCONTINUED | OUTPATIENT
Start: 2023-12-06 | End: 2023-12-09 | Stop reason: HOSPADM

## 2023-12-06 RX ORDER — MISOPROSTOL 200 UG/1
200 TABLET ORAL EVERY 6 HOURS
Status: COMPLETED | OUTPATIENT
Start: 2023-12-06 | End: 2023-12-07

## 2023-12-06 RX ORDER — MISOPROSTOL 200 UG/1
800 TABLET ORAL ONCE AS NEEDED
Status: DISCONTINUED | OUTPATIENT
Start: 2023-12-06 | End: 2023-12-09 | Stop reason: HOSPADM

## 2023-12-06 RX ORDER — IBUPROFEN 400 MG/1
800 TABLET ORAL
Status: DISCONTINUED | OUTPATIENT
Start: 2023-12-06 | End: 2023-12-09 | Stop reason: HOSPADM

## 2023-12-06 RX ORDER — KETOROLAC TROMETHAMINE 30 MG/ML
30 INJECTION, SOLUTION INTRAMUSCULAR; INTRAVENOUS ONCE
Status: COMPLETED | OUTPATIENT
Start: 2023-12-06 | End: 2023-12-06

## 2023-12-06 RX ORDER — CARBOPROST TROMETHAMINE 250 UG/ML
250 INJECTION, SOLUTION INTRAMUSCULAR
Status: DISCONTINUED | OUTPATIENT
Start: 2023-12-06 | End: 2023-12-09 | Stop reason: HOSPADM

## 2023-12-06 RX ORDER — CEFAZOLIN 2 G/1
INJECTION, POWDER, FOR SOLUTION INTRAMUSCULAR; INTRAVENOUS
Status: DISCONTINUED | OUTPATIENT
Start: 2023-12-06 | End: 2023-12-06

## 2023-12-06 RX ORDER — NALBUPHINE HYDROCHLORIDE 10 MG/ML
5 INJECTION, SOLUTION INTRAMUSCULAR; INTRAVENOUS; SUBCUTANEOUS ONCE AS NEEDED
Status: DISCONTINUED | OUTPATIENT
Start: 2023-12-06 | End: 2023-12-09 | Stop reason: HOSPADM

## 2023-12-06 RX ORDER — DIPHENHYDRAMINE HCL 25 MG
25 CAPSULE ORAL EVERY 6 HOURS PRN
Status: DISCONTINUED | OUTPATIENT
Start: 2023-12-06 | End: 2023-12-09 | Stop reason: HOSPADM

## 2023-12-06 RX ORDER — TRANEXAMIC ACID 10 MG/ML
1000 INJECTION, SOLUTION INTRAVENOUS EVERY 30 MIN PRN
Status: DISCONTINUED | OUTPATIENT
Start: 2023-12-06 | End: 2023-12-09 | Stop reason: HOSPADM

## 2023-12-06 RX ORDER — ONDANSETRON 8 MG/1
8 TABLET, ORALLY DISINTEGRATING ORAL EVERY 8 HOURS PRN
Status: DISCONTINUED | OUTPATIENT
Start: 2023-12-06 | End: 2023-12-09 | Stop reason: HOSPADM

## 2023-12-06 RX ORDER — ONDANSETRON 2 MG/ML
4 INJECTION INTRAMUSCULAR; INTRAVENOUS EVERY 12 HOURS PRN
Status: DISCONTINUED | OUTPATIENT
Start: 2023-12-06 | End: 2023-12-06

## 2023-12-06 RX ORDER — DEXAMETHASONE SODIUM PHOSPHATE 4 MG/ML
INJECTION, SOLUTION INTRA-ARTICULAR; INTRALESIONAL; INTRAMUSCULAR; INTRAVENOUS; SOFT TISSUE
Status: DISCONTINUED | OUTPATIENT
Start: 2023-12-06 | End: 2023-12-06

## 2023-12-06 RX ORDER — SODIUM CHLORIDE, SODIUM LACTATE, POTASSIUM CHLORIDE, CALCIUM CHLORIDE 600; 310; 30; 20 MG/100ML; MG/100ML; MG/100ML; MG/100ML
INJECTION, SOLUTION INTRAVENOUS CONTINUOUS
Status: DISCONTINUED | OUTPATIENT
Start: 2023-12-06 | End: 2023-12-09 | Stop reason: HOSPADM

## 2023-12-06 RX ORDER — METHYLERGONOVINE MALEATE 0.2 MG/ML
200 INJECTION INTRAVENOUS ONCE AS NEEDED
Status: COMPLETED | OUTPATIENT
Start: 2023-12-06 | End: 2023-12-06

## 2023-12-06 RX ORDER — ONDANSETRON 2 MG/ML
4 INJECTION INTRAMUSCULAR; INTRAVENOUS EVERY 6 HOURS PRN
Status: DISCONTINUED | OUTPATIENT
Start: 2023-12-06 | End: 2023-12-09 | Stop reason: HOSPADM

## 2023-12-06 RX ORDER — FAMOTIDINE 10 MG/ML
20 INJECTION INTRAVENOUS
Status: COMPLETED | OUTPATIENT
Start: 2023-12-06 | End: 2023-12-06

## 2023-12-06 RX ORDER — OXYTOCIN/RINGER'S LACTATE 30/500 ML
PLASTIC BAG, INJECTION (ML) INTRAVENOUS
Status: DISPENSED
Start: 2023-12-06 | End: 2023-12-06

## 2023-12-06 RX ORDER — ONDANSETRON HYDROCHLORIDE 2 MG/ML
INJECTION, SOLUTION INTRAMUSCULAR; INTRAVENOUS
Status: DISCONTINUED | OUTPATIENT
Start: 2023-12-06 | End: 2023-12-06

## 2023-12-06 RX ORDER — AMOXICILLIN 250 MG
1 CAPSULE ORAL NIGHTLY PRN
Status: DISCONTINUED | OUTPATIENT
Start: 2023-12-06 | End: 2023-12-08

## 2023-12-06 RX ORDER — NALOXONE HCL 0.4 MG/ML
0.04 VIAL (ML) INJECTION
Status: DISCONTINUED | OUTPATIENT
Start: 2023-12-06 | End: 2023-12-09 | Stop reason: HOSPADM

## 2023-12-06 RX ORDER — OXYTOCIN/RINGER'S LACTATE 30/500 ML
95 PLASTIC BAG, INJECTION (ML) INTRAVENOUS CONTINUOUS
Status: DISCONTINUED | OUTPATIENT
Start: 2023-12-06 | End: 2023-12-09 | Stop reason: HOSPADM

## 2023-12-06 RX ORDER — DIPHENHYDRAMINE HCL 25 MG
25 CAPSULE ORAL ONCE
Status: COMPLETED | OUTPATIENT
Start: 2023-12-06 | End: 2023-12-06

## 2023-12-06 RX ORDER — ACETAMINOPHEN 500 MG
1000 TABLET ORAL
Status: COMPLETED | OUTPATIENT
Start: 2023-12-06 | End: 2023-12-06

## 2023-12-06 RX ORDER — SIMETHICONE 80 MG
1 TABLET,CHEWABLE ORAL EVERY 6 HOURS PRN
Status: DISCONTINUED | OUTPATIENT
Start: 2023-12-06 | End: 2023-12-09 | Stop reason: HOSPADM

## 2023-12-06 RX ORDER — MORPHINE SULFATE 0.5 MG/ML
INJECTION, SOLUTION EPIDURAL; INTRATHECAL; INTRAVENOUS
Status: DISCONTINUED | OUTPATIENT
Start: 2023-12-06 | End: 2023-12-06

## 2023-12-06 RX ORDER — PRENATAL WITH FERROUS FUM AND FOLIC ACID 3080; 920; 120; 400; 22; 1.84; 3; 20; 10; 1; 12; 200; 27; 25; 2 [IU]/1; [IU]/1; MG/1; [IU]/1; MG/1; MG/1; MG/1; MG/1; MG/1; MG/1; UG/1; MG/1; MG/1; MG/1; MG/1
1 TABLET ORAL DAILY
Status: DISCONTINUED | OUTPATIENT
Start: 2023-12-06 | End: 2023-12-09 | Stop reason: HOSPADM

## 2023-12-06 RX ORDER — FENTANYL CITRATE 50 UG/ML
INJECTION, SOLUTION INTRAMUSCULAR; INTRAVENOUS
Status: DISCONTINUED | OUTPATIENT
Start: 2023-12-06 | End: 2023-12-06

## 2023-12-06 RX ORDER — PROCHLORPERAZINE EDISYLATE 5 MG/ML
5 INJECTION INTRAMUSCULAR; INTRAVENOUS EVERY 6 HOURS PRN
Status: DISCONTINUED | OUTPATIENT
Start: 2023-12-06 | End: 2023-12-06

## 2023-12-06 RX ADMIN — PRENATAL VIT W/ FE FUMARATE-FA TAB 27-0.8 MG 1 TABLET: 27-0.8 TAB at 12:12

## 2023-12-06 RX ADMIN — DEXAMETHASONE SODIUM PHOSPHATE 4 MG: 4 INJECTION INTRA-ARTICULAR; INTRALESIONAL; INTRAMUSCULAR; INTRAVENOUS; SOFT TISSUE at 06:12

## 2023-12-06 RX ADMIN — MISOPROSTOL 800 MCG: 200 TABLET ORAL at 09:12

## 2023-12-06 RX ADMIN — OXYCODONE HYDROCHLORIDE 5 MG: 5 TABLET ORAL at 10:12

## 2023-12-06 RX ADMIN — OXYTOCIN 3 UNITS: 10 INJECTION, SOLUTION INTRAMUSCULAR; INTRAVENOUS at 07:12

## 2023-12-06 RX ADMIN — OXYCODONE HYDROCHLORIDE 10 MG: 10 TABLET ORAL at 03:12

## 2023-12-06 RX ADMIN — Medication 95 MILLI-UNITS/MIN: at 08:12

## 2023-12-06 RX ADMIN — ONDANSETRON 4 MG: 2 INJECTION INTRAMUSCULAR; INTRAVENOUS at 06:12

## 2023-12-06 RX ADMIN — ACETAMINOPHEN 1000 MG: 500 TABLET ORAL at 06:12

## 2023-12-06 RX ADMIN — DOCUSATE SODIUM 200 MG: 100 CAPSULE, LIQUID FILLED ORAL at 09:12

## 2023-12-06 RX ADMIN — DOCUSATE SODIUM 200 MG: 100 CAPSULE, LIQUID FILLED ORAL at 12:12

## 2023-12-06 RX ADMIN — MISOPROSTOL 200 MCG: 200 TABLET ORAL at 03:12

## 2023-12-06 RX ADMIN — FAMOTIDINE 20 MG: 10 INJECTION, SOLUTION INTRAVENOUS at 06:12

## 2023-12-06 RX ADMIN — ACETAMINOPHEN 650 MG: 325 TABLET, FILM COATED ORAL at 11:12

## 2023-12-06 RX ADMIN — ACETAMINOPHEN 650 MG: 325 TABLET, FILM COATED ORAL at 06:12

## 2023-12-06 RX ADMIN — METHYLERGONOVINE MALEATE 200 MCG: 0.2 INJECTION, SOLUTION INTRAMUSCULAR; INTRAVENOUS at 08:12

## 2023-12-06 RX ADMIN — MORPHINE SULFATE 0.1 MG: 0.5 INJECTION, SOLUTION EPIDURAL; INTRATHECAL; INTRAVENOUS at 06:12

## 2023-12-06 RX ADMIN — PHENYLEPHRINE HYDROCHLORIDE 0.5 MCG/KG/MIN: 10 INJECTION INTRAVENOUS at 06:12

## 2023-12-06 RX ADMIN — DIPHENHYDRAMINE HYDROCHLORIDE 25 MG: 25 CAPSULE ORAL at 04:12

## 2023-12-06 RX ADMIN — IBUPROFEN 800 MG: 400 TABLET ORAL at 11:12

## 2023-12-06 RX ADMIN — ACETAMINOPHEN 650 MG: 325 TABLET, FILM COATED ORAL at 12:12

## 2023-12-06 RX ADMIN — SODIUM CITRATE AND CITRIC ACID MONOHYDRATE 30 ML: 500; 334 SOLUTION ORAL at 06:12

## 2023-12-06 RX ADMIN — SODIUM CHLORIDE, POTASSIUM CHLORIDE, SODIUM LACTATE AND CALCIUM CHLORIDE: 600; 310; 30; 20 INJECTION, SOLUTION INTRAVENOUS at 06:12

## 2023-12-06 RX ADMIN — BUPIVACAINE HYDROCHLORIDE IN DEXTROSE 1.8 ML: 7.5 INJECTION, SOLUTION SUBARACHNOID at 06:12

## 2023-12-06 RX ADMIN — CEFAZOLIN 2 G: 330 INJECTION, POWDER, FOR SOLUTION INTRAMUSCULAR; INTRAVENOUS at 06:12

## 2023-12-06 RX ADMIN — Medication 200 ML: at 07:12

## 2023-12-06 RX ADMIN — SODIUM CHLORIDE, SODIUM LACTATE, POTASSIUM CHLORIDE, AND CALCIUM CHLORIDE: 600; 310; 30; 20 INJECTION, SOLUTION INTRAVENOUS at 07:12

## 2023-12-06 RX ADMIN — KETOROLAC TROMETHAMINE 30 MG: 30 INJECTION, SOLUTION INTRAMUSCULAR at 07:12

## 2023-12-06 RX ADMIN — MISOPROSTOL 200 MCG: 200 TABLET ORAL at 11:12

## 2023-12-06 RX ADMIN — Medication 100 ML: at 07:12

## 2023-12-06 RX ADMIN — IBUPROFEN 800 MG: 400 TABLET ORAL at 03:12

## 2023-12-06 RX ADMIN — FENTANYL CITRATE 10 MCG: 50 INJECTION INTRAMUSCULAR; INTRAVENOUS at 06:12

## 2023-12-06 NOTE — L&D DELIVERY NOTE
[facility] - Labor & Delivery   Section   Operative Note    SUMMARY     Date of Procedure: 2023     Procedure: Procedure(s) (LRB):   SECTION (N/A)    Surgeon(s) and Role:     * Justin De La Fuente IV, MD - Primary     * Traci Carney MD - Resident - Assisting    Pre-Operative Diagnosis:   History of  section x1  S/P myomectomy [Z98.890]    Post-Operative Diagnosis: Post-Op Diagnosis Codes:  S/P repeat low transverse  section     * S/P myomectomy [Z98.890]    Anesthesia: Spinal/Epidural           Description of the Findings of the Procedure:   1. Single viable  female infant, with APGARS 9/9, weight 2760g.   2. Normal appearing uterus, ovaries, and fallopian tubes.  3. Normal placenta  4. Mild scarring of rectus muscle     Complications: No    Blood Loss: 460mL    PreOp CBC:   Lab Results   Component Value Date    WBC 9.33 2023    HGB 9.4 (L) 2023    HCT 30.8 (L) 2023    MCV 79 (L) 2023     2023       Procedure Details   The patient was seen in the Holding Room. The risks, benefits, complications, treatment options, and expected outcomes were discussed with the patient.  The patient concurred with the proposed plan, giving informed consent.  The patient was taken to Operating Room, identified as Sangeeta Paniagua and the procedure verified as  Delivery. A Time Out was held and the above information confirmed.    After induction of anesthesia, the patient was prepped and draped in the usual sterile manner while placed in a dorsal supine position with a left lateral tilt.  A johnson catheter was also placed per nursing. Preoperative antibiotics Ancef 2 g was administered. An allis test was performed to confirm adequate anesthesia.  A Pfannenstiel skin incision was made and carried down through the subcutaneous tissue to the fascia. Fascial incision was made and extended transversely. The fascia was grasped with Ochsner clamps and   from the underlying rectus tissue superiorly and inferiorly. The midline of the rectus was identified and grasped on either side and elevated with Allis clamps. The midline was incised with a scalpel. The peritoneum was identified, found to be free of adherent bowel, and entered with hemostats and Metzenbaum scissors. The peritoneal incision was extended longitudinally. The vesico-uterine peritoneum was identified, and bladder blade was inserted. The vesico-uterine peritoneum was incised transversely and the bladder flap was bluntly freed from the lower uterine segment. The bladder blade was reinserted to keep the bladder out of the operative field. A low transverse uterine incision was made with knife and extended with bandage scissors. The amniotic sac was ruptured upon entry to the hysterotomy and the infant was noted to be in cephalic presentation. The fetal head was brought to the incision and elevated out of the pelvis. The patient delivered a single viable female infant without difficulty.  Infant weighed 2760 grams with Apgar scores of 9/9 at one and five minutes respectively. After the umbilical cord was clamped and cut, cord blood was obtained for evaluation. The placenta was removed intact, appeared normal, and was discarded. The uterus was exteriorized. The uterine outline, tubes and ovaries appeared normal. The uterine incision was closed with running locked sutures of 1-0 chromic.  A 2nd  imbricating layer of 0 chromic in interrupted figure-of-eight fashion was placed. Excellent hemostasis was observed and excellent hysterotomy closure was noted.  The uterus was then returned to the abdominal cavity. Incision was reinspected and good hemostasis was noted. The abdominal cavity was irrigated to remove clots. The peritoneum and muscle were reapproximated with 2-0 vicryl and 1-0 chromic respectively. The fascia was then reapproximated with running sutures of 1-0 PDS. The subcutaneous fat was  "reapproximated with 2-0 Vicryl, and skin was reapproximated with 4-0 monocryl in a subcuticular fashion.    Instrument, sponge, and needle counts were correct prior the abdominal closure and at the conclusion of the case.     The patient tolerated procedure well and was taken to the recovery room in stable condition after the procedure.    **NOTE: This patient is NOT a candidate for trial of labor after  delivery**    Traci Carney M.D.  OB/GYN PGY- 4              Specimens:   Specimen (24h ago, onward)      None            Condition: Good    VTE Risk Mitigation (From admission, onward)           Ordered     Reason for No Pharmacological VTE Prophylaxis  Once        Question:  Reasons:  Answer:  Neuraxial Anesthesia    23     Place sequential compression device  Until discontinued         23                    Disposition: PACU - hemodynamically stable.    Attestation: Good         Delivery Information for Jordan Paniagua    Birth information:  YOB: 2023   Time of birth: 7:10 AM   Sex: female   Head Delivery Date/Time: 2023  7:10 AM   Delivery type: , Low Transverse   Gestational Age: 37w1d        Delivery Providers    Delivering clinician: Justin De La Fuente IV, MD   Provider Role    Traci Carney MD Resident    Patti Howard RN Circulator    [facility]za[facility]    Sabiha Garcia RN Registered Nurse    Rochelle Lowery RN Registered Nurse              Measurements    Weight: 2760 g  Weight (lbs): 6 lb 1.4 oz  Length: 48.9 cm  Length (in): 19.25"  Head circumference: 33.7 cm  Chest circumference: 33 cm         Apgars    Living status: Living  Apgar Component Scores:  1 min.:  5 min.:  10 min.:  15 min.:  20 min.:    Skin color:  1  1       Heart rate:  2  2       Reflex irritability:  2  2       Muscle tone:  2  2       Respiratory effort:  2  2       Total:  9  9       Apgars assigned by: TIGRE GARCIA RN   "       Operative Delivery    Forceps attempted?: No  Vacuum extractor attempted?: No         Shoulder Dystocia    Shoulder dystocia present?: No           Presentation    Presentation: Vertex  Position: Right Occiput Anterior           Interventions/Resuscitation    Method: Bulb Suctioning, Tactile Stimulation, Deep Suctioning       Cord    Vessels: 3 vessels  Complications: None  Delayed Cord Clamping?: Yes  Cord Clamped Date/Time: 2023  7:11 AM  Cord Blood Disposition: Sent with Baby  Gases Sent?: No  Stem Cell Collection (by MD): No       Placenta    Placenta delivery date/time: 2023 0712  Placenta removal: Manual removal  Placenta appearance: Intact  Placenta disposition: Discarded           Labor Events:       labor: No     Labor Onset Date/Time:         Dilation Complete Date/Time:         Start Pushing Date/Time:         Start Pushing Date/Time:       Rupture Date/Time:            Rupture type:          Fluid Amount:       Fluid Color:                steroids: None     Antibiotics given for GBS: No     Induction:       Indications for induction:        Augmentation:       Indications for augmentation:       Labor complications: None     Additional complications:          Cervical ripening:                     Delivery:      Episiotomy:       Indication for Episiotomy:       Perineal Lacerations:   Repaired:      Periurethral Laceration:   Repaired:     Labial Laceration:   Repaired:     Sulcus Laceration:   Repaired:     Vaginal Laceration:   Repaired:     Cervical Laceration:   Repaired:     Repair suture:       Repair # of packets:       Last Value - EBL - Nursing (mL):       Sum - EBL - Nursing (mL): 0     Last Value - EBL - Anesthesia (mL):      Calculated QBL (mL): 460      Vaginal Sweep Performed:       Surgicount Correct:       Vaginal Packing:   Quantity:       Other providers:       Anesthesia    Method: Spinal          Details (if applicable):  Trial of Labor No     Categorization: Repeat    Priority: Routine   Indications for : Prior Uterine Surgery   Incision Type: low transverse     Additional  information:  Forceps:    Vacuum:    Breech:    Observed anomalies    Other (Comments):           I was present and performed this entire procedure.    Justin De La Fuente IV, MD

## 2023-12-06 NOTE — PLAN OF CARE
VSS. Breast feeding independently. Pimentel in place. Pain well controlled with PRN pain meds. Dressing dry and intact with no signs of infection. FOB at bedside. No acute events this shift. No additional needs at this time.

## 2023-12-06 NOTE — PROGRESS NOTES
12/06/23 0835   Reproductive   Uterus WDL WDL;all   Uterus Position midline   Uterus Consistency firm without massage   Fundal Height 1 cm below umbilicus   Lochia 1-3 Days WDL   Lochia 1-3 Days WDL ex;amount   Lochia Color rubra   Lochia Amount heavy (saturating pad/hr)   Lochia Odor none   Perineum WDL   Perineum WDL WDL       MD called to bedside for heavy vaginal bleeding. Bimanual exam performed by MD. IM methergine verbally ordered by MD Ruby, and given by RN. Pt asymptomatic. Care ongoing.

## 2023-12-06 NOTE — ANESTHESIA PROCEDURE NOTES
Spinal    Diagnosis: IUP  Patient location during procedure: OR  Start time: 12/6/2023 6:46 AM  Timeout: 12/6/2023 6:45 AM  End time: 12/6/2023 6:48 AM    Staffing  Authorizing Provider: Jerry Noe MD  Performing Provider: Jerry Noe MD    Staffing  Performed by: Jerry Noe MD  Authorized by: Jerry Noe MD    Preanesthetic Checklist  Completed: patient identified, IV checked, site marked, risks and benefits discussed, surgical consent, monitors and equipment checked, pre-op evaluation and timeout performed  Spinal Block  Patient position: sitting  Prep: ChloraPrep  Patient monitoring: heart rate  Approach: midline  Location: L3-4  Injection technique: single shot  CSF Fluid: clear free-flowing CSF  Needle  Needle type: pencil-tip   Needle gauge: 25 G  Needle length: 3.5 in  Additional Documentation: incremental injection, no paresthesia on injection and negative aspiration for heme  Needle localization: anatomical landmarks  Assessment  Ease of block: easy  Patient's tolerance of the procedure: no complaints and comfortable throughout block

## 2023-12-06 NOTE — H&P
HISTORY AND PHYSICAL                                                OBSTETRICS          Subjective:       Sangeeta Paniagua is a 38 y.o.  female with IUP at 37w1d weeks gestation who presents for scheduled repeat  section.    Patient denies contractions, denies vaginal bleeding, denies LOF.   Fetal Movement: normal.    This IUP is complicated by AMA and history of prior  and myomectomy.    Review of Systems   Constitutional:  Negative for chills and fever.   Respiratory:  Negative for shortness of breath.    Cardiovascular:  Negative for chest pain.   Gastrointestinal:  Negative for abdominal pain, nausea and vomiting.   Endocrine: Negative for hot flashes.   Genitourinary:  Negative for dysuria and vaginal bleeding.   Integumentary:  Negative for breast mass, nipple discharge and breast skin changes.   Neurological:  Negative for headaches.   Hematological:  Does not bruise/bleed easily.   Psychiatric/Behavioral:  Negative for depression.    Breast: Negative for mass, mastodynia, nipple discharge and skin changes      PMHx:   Past Medical History:   Diagnosis Date    Constipation     Heart palpitations 2021    Pregnancy with history of uterine myomectomy 2022    Uterine fibroids affecting pregnancy        PSHx:   Past Surgical History:   Procedure Laterality Date    MYOMECTOMY  2016       All: Review of patient's allergies indicates:  No Known Allergies    Meds:   No medications prior to admission.       SH:   Social History     Socioeconomic History    Marital status:    Tobacco Use    Smoking status: Never    Smokeless tobacco: Never   Substance and Sexual Activity    Alcohol use: Never    Drug use: Never    Sexual activity: Yes     Partners: Male     Comment:   - Paz Paniagua       FH:   Family History   Problem Relation Age of Onset    Breast cancer Maternal Grandmother 75    Colon cancer Neg Hx     Ovarian cancer Neg Hx        OBHx:   OB History    Para Term   AB Living   2 0 0 0 0 1   SAB IAB Ectopic Multiple Live Births   0 0 0 0 1      # Outcome Date GA Lbr Charles/2nd Weight Sex Delivery Anes PTL Lv   2 Current            1      F CS-Unspec   FREDDY       Objective:       There were no vitals taken for this visit.    There were no vitals filed for this visit.    General:   alert, appears stated age and cooperative, no apparent distress   HENT:  normocephalic, atraumatic   Eyes:  extraocular movements and conjunctivae normal   Neck:  supple, range of motion normal, no thyromegaly   Lungs:   no respiratory distress   Heart:   regular rate   Abdomen:  soft, non-tender, non-distended but gravid, no rebound or guarding    Extremities negative edema, negative erythema   FHT: 140, moderate BTBV, +accels, -decels;  Cat 1 (reassuring)                 TOCO: Quiet   Presentations: cephalic by ultrasound   Cervix:     Dilation: 4cm    Effacement: 80%    Station:  -2    Consistency: soft    Position: middle   Sterile Speculum Exam: n/a    EFW by Leopold's: 7.5lb    Recent Growth Scan: 1924g 29% at 31w0d    Lab Review  Blood Type A POS  GBBS: negative  Rubella: Immune  RPR: non-reactive  HIV: negative  HepB: negative       Assessment:       37w1d weeks gestation with history of prior  here for repeat  delivery    Active Hospital Problems    Diagnosis  POA    *History of  section complicating pregnancy [O34.219]  Yes    Low back pain during pregnancy in third trimester [O26.893, M54.50]  Yes    Advanced maternal age in multigravida [O09.529]  Yes    History of myomectomy [Z98.890]  Not Applicable    Iron deficiency [E61.1]  Yes    Leiomyoma of uterus, unspecified [D25.9]  Yes      Resolved Hospital Problems   No resolved problems to display.          Plan:    delivery   Risks, benefits, alternatives and possible complications have been discussed in detail with the patient.   - Consents signed and to chart  - Admit to Labor and Delivery unit    - Epidural per Anesthesia  - Draw CBC, T&S  - Notify Staff  - To OR for repeat  with Dr. De La Fuente    2. AMA  - Neg matT21 and AFP    Post-Partum Hemorrhage risk - medium    Traci Carney M.D.  OB/GYN PGY- 4

## 2023-12-06 NOTE — PROGRESS NOTES
12/06/23 0935   Reproductive   Uterus WDL WDL;all   Uterus Position midline   Uterus Consistency firm without massage;clots expressed   Fundal Height 1 cm below umbilicus   Lochia 1-3 Days WDL   Lochia 1-3 Days WDL ex;amount   Lochia Color clots present;rubra   Lochia Amount excessive (saturated pad in 15 min)   Lochia Odor none   Perineum WDL   Perineum WDL WDL     MD called to bedside to assess. Clots expressed. Bimanual exam performed. MD ordered cytotec series. 800mcg cytotec given by MD rectally. CBC and coags ordered and collected. VSS. Afebrile. Pt asymptomatic. Care ongoing.

## 2023-12-06 NOTE — ANESTHESIA PREPROCEDURE EVALUATION
"                                                                                                            Sangeeta Paniagua is a 38 y.o. female is  presenting for C section. Pt has had a prior myomectomy and C section. Hgb 9.7.  22 cm fibroid was the reason for previous myomectomy.     OB History    Para Term  AB Living   2         1   SAB IAB Ectopic Multiple Live Births           1      # Outcome Date GA Lbr Charles/2nd Weight Sex Delivery Anes PTL Lv   2 Current            1      F CS-Unspec   FREDDY       Wt Readings from Last 1 Encounters:   23 1053 99 kg (218 lb 4.1 oz)       BP Readings from Last 3 Encounters:   23 110/64   23 110/64   23 110/68       Patient Active Problem List   Diagnosis    Iron deficiency    Leiomyoma of uterus, unspecified    History of  section complicating pregnancy    Pregnancy, supervision, high-risk    Advanced maternal age in multigravida    History of myomectomy    Pregnancy w/ hx of uterine myomectomy    Low back pain during pregnancy in third trimester    SI (sacroiliac) joint dysfunction       Past Surgical History:   Procedure Laterality Date    MYOMECTOMY  2016       Social History     Socioeconomic History    Marital status:    Tobacco Use    Smoking status: Never    Smokeless tobacco: Never   Substance and Sexual Activity    Alcohol use: Never    Drug use: Never    Sexual activity: Yes     Partners: Male     Comment:   - Paz Paniagua         Chemistry    No results found for: "NA", "K", "CL", "CO2", "BUN", "CREATININE", "GLU" No results found for: "CALCIUM", "ALKPHOS", "AST", "ALT", "BILITOT", "ESTGFRAFRICA", "EGFRNONAA"         Lab Results   Component Value Date    WBC 9.02 2023    HGB 9.7 (L) 2023    HCT 31.6 (L) 2023    MCV 80 (L) 2023     2023       No results for input(s): "PT", "INR", "PROTIME", "APTT" in the last 72 hours.                Pre-op Assessment    I have " reviewed the Patient Summary Reports.     I have reviewed the Nursing Notes. I have reviewed the NPO Status.   I have reviewed the Medications.     Review of Systems  Anesthesia Hx:   Neg history of prior surgery.          Denies Family Hx of Anesthesia complications.    Denies Personal Hx of Anesthesia complications.                    Social:  Non-Smoker       Hematology/Oncology:       -- Denies Anemia:                                  Cardiovascular:      Denies Hypertension.   Denies MI.     Denies CABG/stent.     Denies CHF.       ECG has been reviewed.                          Pulmonary:    Denies COPD.  Denies Asthma.                    Renal/:   Denies Chronic Renal Disease.                Hepatic/GI:      Denies GERD. Denies Liver Disease.            Musculoskeletal:         Denies Spine Disorders             Neurological:    Denies CVA.    Denies Seizures.                                Endocrine:  Denies Diabetes. Denies Hypothyroidism.              Physical Exam  General: Well nourished, Cooperative and Oriented    Airway:  Mallampati: II   Tongue: Normal    Dental:  Intact        Anesthesia Plan  Type of Anesthesia, risks & benefits discussed:    Anesthesia Type: Gen ETT, MAC, Epidural, Spinal  Intra-op Monitoring Plan: Standard ASA Monitors  Post Op Pain Control Plan: multimodal analgesia and IV/PO Opioids PRN  Induction:  IV  Airway Plan: Direct and Video  Informed Consent: Informed consent signed with the Patient and all parties understand the risks and agree with anesthesia plan.  All questions answered. Patient consented to blood products? Yes  ASA Score: 2  Day of Surgery Review of History & Physical: H&P Update referred to the surgeon/provider.    Ready For Surgery From Anesthesia Perspective.     .

## 2023-12-06 NOTE — PLAN OF CARE
23 0834   OB SCREEN   Assessment Type Discharge Planning Assessment   Source of Information health record   Received Prenatal Care Yes   Any indications/suspicions for None   Is this a teen pregnancy No   Is the baby in NICU No   Indication for adoption/Safe Haven No   Indication for DME/post-acute needs No   HIV (+) No   Any congenital  disorders No   Fetal demise/ death No     Patient has been screened for Social Work discharge planning needs. Based on documentation in medical record, no discharge planning needs are anticipated at this time. Should any discharge planning needs arise, please consult .

## 2023-12-06 NOTE — CARE UPDATE
To bedside for evaluation of vaginal bleeding. On fundal check RN was able to express a large amount of clot with continued bleeding. Patient also reporting some lightheadedness. On exam, cervix 1cm dilated with large amount of clot palpable within the uterine cavity. Clot was expressed and tone excellent following exam. Due to intermittent atony 800mcg rectal cytotec was placed. Blood loss was weighed and was approximately 200 mL.     Will check CBC, coags. Will place on cytotec series. If bleeding remains stable, labs are appropriate, and patient is not significantly symptomatic she will be stable for transfer to [facility].    Information was relayed to patient's primary OBGYN, Dr. De La Fuente.     Ade Vera MD  OBGYN, PGY-3

## 2023-12-06 NOTE — TRANSFER OF CARE
"Anesthesia Transfer of Care Note    Patient: Sangeeta Paniagua    Procedure(s) Performed: Procedure(s) (LRB):   SECTION (N/A)    Patient location: Labor and Delivery    Anesthesia Type: spinal    Transport from OR: Transported from OR on room air with adequate spontaneous ventilation    Post pain: adequate analgesia    Post assessment: no apparent anesthetic complications    Post vital signs: stable    Level of consciousness: awake and alert    Nausea/Vomiting: no nausea/vomiting    Complications: none    Transfer of care protocol was followed      Last vitals: Visit Vitals  BP (!) 115/56   Pulse 92   Temp 36.7 °C (98.1 °F) (Oral)   Resp 18   Ht 5' 11" (1.803 m)   Wt 99 kg (218 lb 4.1 oz)   SpO2 100%   Breastfeeding No   BMI 30.44 kg/m²     "

## 2023-12-06 NOTE — CARE UPDATE
Resident to bedside due to reports of vaginal bleeding.    RN initially completed fundal examination and was able to express some bleeding that visually appears 100cc on pad below. No clots expressed.     On exam, fundal examination re-done and no blood expressed. Tone excellent throughout exam.     CBC stable. Will continue to monitor and re-check CBC if patient continues to express and/or is symptomatic.    Patient also desires oral benadryl for diffuse pruritus. Will order.    Ben Calvo MD PGY1  Obstetrics and Gynecology

## 2023-12-07 LAB
BASOPHILS # BLD AUTO: 0.03 K/UL (ref 0–0.2)
BASOPHILS NFR BLD: 0.2 % (ref 0–1.9)
DIFFERENTIAL METHOD: ABNORMAL
EOSINOPHIL # BLD AUTO: 0.1 K/UL (ref 0–0.5)
EOSINOPHIL NFR BLD: 0.5 % (ref 0–8)
ERYTHROCYTE [DISTWIDTH] IN BLOOD BY AUTOMATED COUNT: 15.9 % (ref 11.5–14.5)
HCT VFR BLD AUTO: 27 % (ref 37–48.5)
HGB BLD-MCNC: 8.5 G/DL (ref 12–16)
IMM GRANULOCYTES # BLD AUTO: 0.1 K/UL (ref 0–0.04)
IMM GRANULOCYTES NFR BLD AUTO: 0.7 % (ref 0–0.5)
LYMPHOCYTES # BLD AUTO: 4 K/UL (ref 1–4.8)
LYMPHOCYTES NFR BLD: 28.9 % (ref 18–48)
MCH RBC QN AUTO: 24.4 PG (ref 27–31)
MCHC RBC AUTO-ENTMCNC: 31.5 G/DL (ref 32–36)
MCV RBC AUTO: 78 FL (ref 82–98)
MONOCYTES # BLD AUTO: 1.2 K/UL (ref 0.3–1)
MONOCYTES NFR BLD: 8.5 % (ref 4–15)
NEUTROPHILS # BLD AUTO: 8.4 K/UL (ref 1.8–7.7)
NEUTROPHILS NFR BLD: 61.2 % (ref 38–73)
NRBC BLD-RTO: 0 /100 WBC
PLATELET # BLD AUTO: 202 K/UL (ref 150–450)
PMV BLD AUTO: 10.1 FL (ref 9.2–12.9)
RBC # BLD AUTO: 3.48 M/UL (ref 4–5.4)
WBC # BLD AUTO: 13.69 K/UL (ref 3.9–12.7)

## 2023-12-07 PROCEDURE — 99024 POSTOP FOLLOW-UP VISIT: CPT | Mod: ,,, | Performed by: OBSTETRICS & GYNECOLOGY

## 2023-12-07 PROCEDURE — 99024 PR POST-OP FOLLOW-UP VISIT: ICD-10-PCS | Mod: ,,, | Performed by: OBSTETRICS & GYNECOLOGY

## 2023-12-07 PROCEDURE — 25000003 PHARM REV CODE 250: Performed by: GENERAL PRACTICE

## 2023-12-07 PROCEDURE — 25000003 PHARM REV CODE 250: Performed by: OBSTETRICS & GYNECOLOGY

## 2023-12-07 PROCEDURE — 11000001 HC ACUTE MED/SURG PRIVATE ROOM

## 2023-12-07 PROCEDURE — 25000003 PHARM REV CODE 250: Performed by: STUDENT IN AN ORGANIZED HEALTH CARE EDUCATION/TRAINING PROGRAM

## 2023-12-07 PROCEDURE — 25000003 PHARM REV CODE 250

## 2023-12-07 PROCEDURE — 85025 COMPLETE CBC W/AUTO DIFF WBC: CPT | Performed by: GENERAL PRACTICE

## 2023-12-07 PROCEDURE — 63600175 PHARM REV CODE 636 W HCPCS

## 2023-12-07 PROCEDURE — 36415 COLL VENOUS BLD VENIPUNCTURE: CPT | Performed by: GENERAL PRACTICE

## 2023-12-07 RX ORDER — POLYETHYLENE GLYCOL 3350 17 G/17G
17 POWDER, FOR SOLUTION ORAL 2 TIMES DAILY
Status: DISCONTINUED | OUTPATIENT
Start: 2023-12-07 | End: 2023-12-08

## 2023-12-07 RX ORDER — POLYETHYLENE GLYCOL 3350 17 G/17G
17 POWDER, FOR SOLUTION ORAL DAILY
Status: DISCONTINUED | OUTPATIENT
Start: 2023-12-07 | End: 2023-12-07 | Stop reason: SDUPTHER

## 2023-12-07 RX ADMIN — DOCUSATE SODIUM 200 MG: 100 CAPSULE, LIQUID FILLED ORAL at 08:12

## 2023-12-07 RX ADMIN — ACETAMINOPHEN 650 MG: 325 TABLET, FILM COATED ORAL at 07:12

## 2023-12-07 RX ADMIN — MISOPROSTOL 200 MCG: 200 TABLET ORAL at 11:12

## 2023-12-07 RX ADMIN — PRENATAL VIT W/ FE FUMARATE-FA TAB 27-0.8 MG 1 TABLET: 27-0.8 TAB at 08:12

## 2023-12-07 RX ADMIN — POLYETHYLENE GLYCOL 3350 17 G: 17 POWDER, FOR SOLUTION ORAL at 11:12

## 2023-12-07 RX ADMIN — ACETAMINOPHEN 650 MG: 325 TABLET, FILM COATED ORAL at 12:12

## 2023-12-07 RX ADMIN — IBUPROFEN 800 MG: 400 TABLET ORAL at 03:12

## 2023-12-07 RX ADMIN — IRON SUCROSE 200 MG: 20 INJECTION, SOLUTION INTRAVENOUS at 12:12

## 2023-12-07 RX ADMIN — OXYCODONE HYDROCHLORIDE 10 MG: 10 TABLET ORAL at 07:12

## 2023-12-07 RX ADMIN — ACETAMINOPHEN 650 MG: 325 TABLET, FILM COATED ORAL at 06:12

## 2023-12-07 RX ADMIN — IBUPROFEN 800 MG: 400 TABLET ORAL at 08:12

## 2023-12-07 RX ADMIN — OXYCODONE HYDROCHLORIDE 10 MG: 10 TABLET ORAL at 10:12

## 2023-12-07 RX ADMIN — OXYCODONE HYDROCHLORIDE 10 MG: 10 TABLET ORAL at 02:12

## 2023-12-07 RX ADMIN — MISOPROSTOL 200 MCG: 200 TABLET ORAL at 06:12

## 2023-12-07 RX ADMIN — OXYCODONE HYDROCHLORIDE 10 MG: 10 TABLET ORAL at 03:12

## 2023-12-07 RX ADMIN — IBUPROFEN 800 MG: 400 TABLET ORAL at 11:12

## 2023-12-07 RX ADMIN — POLYETHYLENE GLYCOL 3350 17 G: 17 POWDER, FOR SOLUTION ORAL at 05:12

## 2023-12-07 NOTE — PLAN OF CARE
VSS. Pain moderately controlled with PRN pain medication and heat packs. Fundus midline, firm, with moderate lochia. C/s dressing remains dry and intact. Pt ambulating independently and voiding without difficulty. Patient safety maintained, side rails up, bed low and locked position.  Patient responding to infant cues. Significant other at bedside and assisting in patient's care. Will continue to round as needed.

## 2023-12-07 NOTE — LACTATION NOTE
"Visited patient in room, holding sleeping baby on chest.  Patient c/o difficulty latching baby onto the breasts.  Patient described her first breastfeeding experience as "horrible", having difficulty c the baby losing weight and at risk hyperbilirubinemia and had difficulty latching baby onto the breasts.  Stated she had to "triple" feed and finally exclusively pumped and bottle fed the baby for several months.  Patient states her goal is to "pump as little as possible".  Assisted patient to position baby skin-to-skin. Requested patient to call for assistance at the next feeding.  "

## 2023-12-07 NOTE — PLAN OF CARE
Patient safety maintained, side rails up, bed low and locked position. Pt ambulating and voiding independently.  Pain well controlled with PRN pain medication. Fundus midline, firm, with moderate  lochia. Patient responding to infant cues.  Dressing clean, dry, and intact.  Significant other at bedside and assisting in patient's care. No further needs at this time.

## 2023-12-07 NOTE — ANESTHESIA POSTPROCEDURE EVALUATION
Anesthesia Post Evaluation    Patient: Sangeeta Paniagua    Procedure(s) Performed: Procedure(s) (LRB):   SECTION (N/A)    Final Anesthesia Type: spinal      Patient location during evaluation: labor & delivery  Patient participation: Yes- Able to Participate  Level of consciousness: awake and alert  Post-procedure vital signs: reviewed and stable  Pain management: adequate  Airway patency: patent  CHARLOTTE mitigation strategies: Use of major conduction anesthesia (spinal/epidural) or peripheral nerve block and Multimodal analgesia  PONV status at discharge: No PONV  Anesthetic complications: no      Cardiovascular status: blood pressure returned to baseline, hemodynamically stable and stable  Respiratory status: unassisted, spontaneous ventilation and room air  Hydration status: euvolemic  Follow-up not needed.              Vitals Value Taken Time   /60 23 0830   Temp 37 °C (98.6 °F) 23 08   Pulse 88 23   Resp 18 23 0830   SpO2 98 % 23         Event Time   Out of Recovery 10:45:00         Pain/Herbert Score: Pain Rating Prior to Med Admin: 5 (2023  8:21 AM)  Pain Rating Post Med Admin: 3 (2023  9:00 AM)

## 2023-12-07 NOTE — PROGRESS NOTES
"POSTPARTUM PROGRESS NOTE    Subjective:     PPD/POD#: 1   Procedure: Repeat LTCS   EGA: 37w1d   N/V: No   F/C: No   Abd Pain: Mild, well-controlled with oral pain medication   Lochia: Mild   Voiding: Yes   Ambulating: Yes   Bowel fnc: No   Contraception: Declines   Pt denies symptoms of anemia. Requests IV iron inpatient if possible, declines PO iron due to issues with chronic constipation.    Objective:      Temp:  [97.6 °F (36.4 °C)-98.8 °F (37.1 °C)] 98.6 °F (37 °C)  Pulse:  [54-92] 88  Resp:  [17-18] 18  SpO2:  [95 %-100 %] 98 %  BP: ()/(54-66) 104/60    Abdomen: Soft, appropriately tender   Uterus: Firm, no fundal tenderness   Incision: Bandage in place without shadowing     Lab Review    No results for input(s): "NA", "K", "CL", "CO2", "BUN", "CREATININE", "GLU", "PROT", "BILITOT", "ALKPHOS", "ALT", "AST", "MG", "PHOS" in the last 168 hours.    Recent Labs   Lab 23  0614 23  1000 23  0414   WBC 9.33 13.69* 13.69*   HGB 9.4* 9.9* 8.5*   HCT 30.8* 31.6* 27.0*   MCV 79* 78* 78*    222 202         I/O    Intake/Output Summary (Last 24 hours) at 2023 0912  Last data filed at 2023 0354  Gross per 24 hour   Intake --   Output 4668 ml   Net -4668 ml        Assessment and Plan:   37 yo  POD#1 s/p RLTCS c/b acute on chronic anemia    Postpartum care:  - Patient doing well and meeting appropriate milestones  - Ambulating, voiding, tolerating PO  - Baby at bedside, doing well  - To meet with lactation today, strongly desires breastfeeding but concerned about low volume at this time  - Continue routine management and advances.    2.   Acute on chronic anemia  - H/H as above  - QBL: 868mL  - asymptomatic  - on cytotec series, s/p methergine  - Pt reports inability to use PO iron due to chronic constipation issues. She requests IV iron if possible while inpatient. Will also message primary provider to arrange outpatient IV iron    3.   AMA  - Encourage ambulation  - PP Lovenox " not indicated    Ben Calvo MD PGY1  Obstetrics and Gynecology    Attending Attestation  I agree with the above edited resident note. Pt seen and examined, chart and labs reviewed.    Briefly, 37 yo  POD#1 s/p RLTCS c/b acute on chronic anemia. Pt reports longstanding issues with constipation, declines oral iron. She is asymptomatic for S/S of anemia, transfusion not indicated. Pt requests IV iron if possible while inpt, will also message provider to arrange for outpatient administration. Otherwise doing well, meeting appropriate postop milestones.     Ashley Pineda MD  OB Hospitalist  2023

## 2023-12-07 NOTE — LACTATION NOTE
12/07/23 1250   Maternal Assessment   Breast Shape Bilateral:;round   Breast Density Bilateral:;soft   Areola Bilateral:;elastic   Nipples Bilateral:;everted;graspable   Maternal Infant Feeding   Maternal Emotional State assist needed   Infant Positioning laid back (ventral)   Signs of Milk Transfer infant jaw motion present   Pain with Feeding no   Comfort Measures Before/During Feeding infant position adjusted;maternal position adjusted;suction broken using finger   Latch Assistance yes  (Maximum positioning and latch assistance provided to achieve and sustain a deep latch)     Called to patient's room to assist c a feeding.  Maximum positioning and latch assistance provided, Land R breasts, football and cross cradle positions, deep laches achieved but not sustained - suck dimples noted.  Mother and baby repositioned to laid back position, baby able to achieve and sustain deep latches s suck dimples.  Patient able to independently latch baby onto L breast.  Encouraged patient to allow baby to have extended time at the breasts for learning, feeding, and comforting as long as no nipple pain is present.  Basic eduction provided, Guide reviewed.  Encouraged to call for assistance prn.

## 2023-12-08 PROCEDURE — 25000003 PHARM REV CODE 250: Performed by: GENERAL PRACTICE

## 2023-12-08 PROCEDURE — 11000001 HC ACUTE MED/SURG PRIVATE ROOM

## 2023-12-08 PROCEDURE — 25000003 PHARM REV CODE 250

## 2023-12-08 RX ORDER — POLYETHYLENE GLYCOL 3350 17 G/17G
34 POWDER, FOR SOLUTION ORAL 2 TIMES DAILY
Status: DISCONTINUED | OUTPATIENT
Start: 2023-12-08 | End: 2023-12-09 | Stop reason: HOSPADM

## 2023-12-08 RX ORDER — BISACODYL 10 MG
10 SUPPOSITORY, RECTAL RECTAL DAILY PRN
Status: DISCONTINUED | OUTPATIENT
Start: 2023-12-08 | End: 2023-12-09 | Stop reason: HOSPADM

## 2023-12-08 RX ADMIN — DOCUSATE SODIUM 200 MG: 100 CAPSULE, LIQUID FILLED ORAL at 08:12

## 2023-12-08 RX ADMIN — POLYETHYLENE GLYCOL 3350 34 G: 17 POWDER, FOR SOLUTION ORAL at 08:12

## 2023-12-08 RX ADMIN — ACETAMINOPHEN 650 MG: 325 TABLET, FILM COATED ORAL at 05:12

## 2023-12-08 RX ADMIN — IBUPROFEN 800 MG: 400 TABLET ORAL at 02:12

## 2023-12-08 RX ADMIN — PRENATAL VIT W/ FE FUMARATE-FA TAB 27-0.8 MG 1 TABLET: 27-0.8 TAB at 08:12

## 2023-12-08 RX ADMIN — ACETAMINOPHEN 650 MG: 325 TABLET, FILM COATED ORAL at 08:12

## 2023-12-08 RX ADMIN — OXYCODONE HYDROCHLORIDE 10 MG: 10 TABLET ORAL at 03:12

## 2023-12-08 RX ADMIN — ACETAMINOPHEN 650 MG: 325 TABLET, FILM COATED ORAL at 01:12

## 2023-12-08 RX ADMIN — OXYCODONE HYDROCHLORIDE 10 MG: 10 TABLET ORAL at 09:12

## 2023-12-08 RX ADMIN — IBUPROFEN 800 MG: 400 TABLET ORAL at 11:12

## 2023-12-08 RX ADMIN — OXYCODONE HYDROCHLORIDE 10 MG: 10 TABLET ORAL at 08:12

## 2023-12-08 RX ADMIN — ACETAMINOPHEN 650 MG: 325 TABLET, FILM COATED ORAL at 12:12

## 2023-12-08 RX ADMIN — IBUPROFEN 800 MG: 400 TABLET ORAL at 07:12

## 2023-12-08 RX ADMIN — BISACODYL 10 MG: 10 SUPPOSITORY RECTAL at 12:12

## 2023-12-08 NOTE — PROGRESS NOTES
"POSTPARTUM PROGRESS NOTE    Subjective:     PPD/POD#: 2   Procedure: Repeat LTCS   EGA: 37w1d   N/V: No   F/C: No   Abd Pain: Mild, well-controlled with oral pain medication   Lochia: Mild   Voiding: Yes   Ambulating: Yes   Bowel fnc: No   Contraception: Declines   Pt denies symptoms of anemia. Endorses significant constipation currently and with significant history requiring multiple medications.     Objective:      Temp:  [98 °F (36.7 °C)-98.6 °F (37 °C)] 98 °F (36.7 °C)  Pulse:  [51-93] 93  Resp:  [16-18] 18  SpO2:  [96 %-98 %] 96 %  BP: (101-117)/(52-60) 101/52    Abdomen: Soft, appropriately tender   Uterus: Firm, no fundal tenderness   Incision: Bandage in place without shadowing     Lab Review    No results for input(s): "NA", "K", "CL", "CO2", "BUN", "CREATININE", "GLU", "PROT", "BILITOT", "ALKPHOS", "ALT", "AST", "MG", "PHOS" in the last 168 hours.    Recent Labs   Lab 23  0614 23  1000 23  0414   WBC 9.33 13.69* 13.69*   HGB 9.4* 9.9* 8.5*   HCT 30.8* 31.6* 27.0*   MCV 79* 78* 78*    222 202         I/O  No intake or output data in the 24 hours ending 23 0701       Assessment and Plan:   39 yo  POD#1 s/p RLTCS c/b acute on chronic anemia    Postpartum care:  - Patient doing well and meeting appropriate milestones  - Ambulating, voiding, tolerating PO  - Baby at bedside, doing well  - Continue routine management and advances.    2.   Acute on chronic anemia  - H/H as above  - QBL: 868mL  - asymptomatic  - s/p cytotec series & methergine  - on venofer while inpatient     3.   AMA  - Encourage ambulation  - PP Lovenox not indicated    4. Constipation  - currently on miralax BID, patient requests this to be uptitrated  - bisacodyl suppository PRN  - Will continue to monitor for bowel function      Brandy Galvan MD  OB/GYN PGY1     "

## 2023-12-08 NOTE — PHYSICIAN QUERY
PT Name: Sangeeta Paniagua  MR #: [id number]    DOCUMENTATION CLARIFICATION      CDS/: VALDEZ Roberson,RNC-MNN       Contact information:[email or web address]    This form is a permanent document in the medical record.      Query Date: 2023    By submitting this query, we are merely seeking further clarification of documentation. Please utilize your independent clinical judgment when addressing the question(s) below.    The Medical Record contains the following:   Indicators  Supporting Clinical Findings Location in Medical Record   X Anemia documented Acute on chronic anemia  OB Progress note @915am    X H&H       Recent Labs   Lab 23  0614 23  1000 23  0414   WBC 9.33 13.69* 13.69*   HGB 9.4* 9.9* 8.5*   HCT 30.8* 31.6* 27.0*   MCV 79* 78* 78*    222 202    OB Progress note @915am     BP                    HR      Bleeding     X Procedure/Surgery Performed/EBL Blood loss was weighed and was approximately 200 mL.      Procedure(s) (LRB):   SECTION     Blood Loss: 460mL      Running QBL Total (mL) 878 OB Care update @958am        L&D Delivery note               QBL Calculator     Transfusion(s)     X Acute/Chronic illness s/p RLTCS c/b acute on chronic anemia. Pt reports longstanding issues with constipation, declines oral iron. She is asymptomatic for S/S of anemia, transfusion not indicated.  OB Progress note @915am    X Treatments Pt reports inability to use PO iron due to chronic constipation issues. She requests IV iron if possible while inpatient. Will also message primary provider to arrange outpatient IV iron  OB Progress note @915am     Other       Provider, please further specify the diagnosis of Acute on chronic anemia.  [  x ] Acute blood loss anemia with chronic anemia of pregnancy    [   ] Other : _________________   [   ] Clinically Undetermined     Please document in your progress notes daily for the duration of  treatment, until resolved, and include in your discharge summary.    Form No. [id number]

## 2023-12-08 NOTE — NURSING
Instructed on the cleaning and sterilization of equipment for formula preparation:  Clean and disinfect working surface  Wash hands, arms and under fingernails with soap and water; dry using a clean cloth  Use bottle/nipple brush to wash all bottles, nipples, rings, caps and preparation utensils in hot soapy water before initial use and rinse  Sterilize all parts/utensils in boiling water or with a sterilization device prior to use  Continue to wash all parts with warm soapy water and rinse after each use and sterilize daily  Pt verbalized understanding and provided appropriate recall.

## 2023-12-08 NOTE — LACTATION NOTE
12/08/23 1100   Maternal Assessment   Breast Shape Bilateral:;round   Breast Density Bilateral:;soft   Areola Bilateral:;elastic   Nipples Bilateral:;everted   Maternal Infant Feeding   Maternal Emotional State assist needed   Infant Positioning clutch/football   Signs of Milk Transfer audible swallow;infant jaw motion present   Latch Assistance yes     Assisted pt with position and latch. Baby able to latch to breast in football hold.Good tugs and pulls observed. Pt shown how to use breast compression and stimulation to keep baby actively breastfeeding.basic breastfeeding education provided.questions answered.

## 2023-12-08 NOTE — PHYSICIAN QUERY
PT Name: Sangeeta Paniagua  MR #: [id number]     DOCUMENTATION CLARIFICATION     CDS/: Hannah Bullard,BSN,RNC-MNN          Contact information:[email or web address]  This form is a permanent document in the medical record.    Query Date: 2023  By submitting this query, we are merely seeking further clarification of documentation. Please utilize your independent clinical judgment when addressing the question(s) below.      Indicators Supporting Clinical Findings Location in Medical Record   X Documentation of uterine atony with bleeding, postpartum bleeding, or hemorrhage To bedside for evaluation of vaginal bleeding. On fundal check RN was able to express a large amount of clot with continued bleeding. Patient also reporting some lightheadedness. On exam, cervix 1cm dilated with large amount of clot palpable within the uterine cavity. Clot was expressed and tone excellent following exam. Due to intermittent atony    Resident to bedside due to reports of vaginal bleeding.     RN initially completed fundal examination and was able to express some bleeding that visually appears 100cc on pad below. No clots expressed.      On exam, fundal examination re-done and no blood expressed. Tone excellent throughout exam.  OB Care update @958am                OB Care update @516pm    Documentation of retained placenta     X Delivery type with EBL or QBL Blood loss was weighed and was approximately 200 mL.     Procedure(s) (LRB):   SECTION    Blood Loss: 460mL     Running QBL Total (mL) 878 OB Care update @958am      L&D Delivery note           QBL Calculator    X H/H Recent Labs   Lab 23  0614 23  1000 23  0414   WBC 9.33 13.69* 13.69*   HGB 9.4* 9.9* 8.5*   HCT 30.8* 31.6* 27.0*   MCV 79* 78* 78*    222 202    OB Progress note @915am    Vital signs     X Medications, Treatment 800mcg rectal cytotec was placed     Will check CBC, coags. Will place on  cytotec series. If bleeding remains stable, labs are appropriate, and patient is not significantly symptomatic she will be stable for transfer to [facility].     CBC stable. Will continue to monitor and re-check CBC if patient continues to express and/or is symptomatic.  OB Care update 12/6@958am              OB Care update 12/6@516pm    Blood transfusion      Other        Provider, please specify the diagnosis or diagnoses associated with the above clinical findings:      [   ] Postpartum uterine atony with hemorrhage   [   ] Immediate post-partum hemorrhage   [  x ] Postpartum uterine atony without hemorrhage   [   ] Postpartum bleeding, clinically insignificant   [   ] Other hematological diagnosis (please specify): _________________     Please document in your progress notes daily for the duration of treatment, until resolved, and include in your discharge summary.  Form [id number]

## 2023-12-09 VITALS
DIASTOLIC BLOOD PRESSURE: 63 MMHG | WEIGHT: 218.25 LBS | TEMPERATURE: 98 F | BODY MASS INDEX: 30.56 KG/M2 | OXYGEN SATURATION: 100 % | HEIGHT: 71 IN | RESPIRATION RATE: 18 BRPM | HEART RATE: 69 BPM | SYSTOLIC BLOOD PRESSURE: 111 MMHG

## 2023-12-09 PROCEDURE — 25000003 PHARM REV CODE 250: Performed by: GENERAL PRACTICE

## 2023-12-09 PROCEDURE — 25000003 PHARM REV CODE 250

## 2023-12-09 RX ORDER — DOCUSATE SODIUM 100 MG/1
200 CAPSULE, LIQUID FILLED ORAL 2 TIMES DAILY
Qty: 60 CAPSULE | Refills: 2 | Status: SHIPPED | OUTPATIENT
Start: 2023-12-09

## 2023-12-09 RX ORDER — IBUPROFEN 800 MG/1
800 TABLET ORAL EVERY 6 HOURS PRN
Qty: 32 TABLET | Refills: 0 | Status: SHIPPED | OUTPATIENT
Start: 2023-12-09

## 2023-12-09 RX ORDER — OXYCODONE HYDROCHLORIDE 5 MG/1
5 TABLET ORAL EVERY 4 HOURS PRN
Qty: 20 TABLET | Refills: 0 | Status: SHIPPED | OUTPATIENT
Start: 2023-12-09

## 2023-12-09 RX ORDER — ACETAMINOPHEN 325 MG/1
650 TABLET ORAL EVERY 6 HOURS PRN
Qty: 32 TABLET | Refills: 0 | Status: SHIPPED | OUTPATIENT
Start: 2023-12-09

## 2023-12-09 RX ORDER — IBUPROFEN 800 MG/1
800 TABLET ORAL EVERY 6 HOURS PRN
Qty: 32 TABLET | Refills: 0 | Status: SHIPPED | OUTPATIENT
Start: 2023-12-09 | End: 2023-12-09 | Stop reason: SDUPTHER

## 2023-12-09 RX ORDER — OMEPRAZOLE 20 MG/1
20 CAPSULE, DELAYED RELEASE ORAL DAILY
Qty: 30 CAPSULE | Refills: 11 | Status: SHIPPED | OUTPATIENT
Start: 2023-12-09 | End: 2024-12-08

## 2023-12-09 RX ORDER — DOCUSATE SODIUM 100 MG/1
200 CAPSULE, LIQUID FILLED ORAL 2 TIMES DAILY
Qty: 60 CAPSULE | Refills: 2 | Status: SHIPPED | OUTPATIENT
Start: 2023-12-09 | End: 2023-12-09 | Stop reason: SDUPTHER

## 2023-12-09 RX ORDER — ACETAMINOPHEN 325 MG/1
650 TABLET ORAL EVERY 6 HOURS PRN
Qty: 32 TABLET | Refills: 0 | Status: SHIPPED | OUTPATIENT
Start: 2023-12-09 | End: 2023-12-09 | Stop reason: SDUPTHER

## 2023-12-09 RX ADMIN — DOCUSATE SODIUM 200 MG: 100 CAPSULE, LIQUID FILLED ORAL at 09:12

## 2023-12-09 RX ADMIN — ACETAMINOPHEN 650 MG: 325 TABLET, FILM COATED ORAL at 01:12

## 2023-12-09 RX ADMIN — POLYETHYLENE GLYCOL 3350 34 G: 17 POWDER, FOR SOLUTION ORAL at 09:12

## 2023-12-09 RX ADMIN — ACETAMINOPHEN 650 MG: 325 TABLET, FILM COATED ORAL at 09:12

## 2023-12-09 RX ADMIN — ACETAMINOPHEN 650 MG: 325 TABLET, FILM COATED ORAL at 02:12

## 2023-12-09 RX ADMIN — PRENATAL VIT W/ FE FUMARATE-FA TAB 27-0.8 MG 1 TABLET: 27-0.8 TAB at 09:12

## 2023-12-09 RX ADMIN — IBUPROFEN 800 MG: 400 TABLET ORAL at 09:12

## 2023-12-09 RX ADMIN — OXYCODONE HYDROCHLORIDE 10 MG: 10 TABLET ORAL at 02:12

## 2023-12-09 NOTE — DISCHARGE SUMMARY
Delivery Discharge Summary  Obstetrics      Primary OB Clinician: Justin De La Fuente IV, MD      Admission date: 2023  Discharge date: 2023    Disposition: To home, self care    Discharge Diagnosis List:      Patient Active Problem List   Diagnosis    Iron deficiency    Leiomyoma of uterus, unspecified    History of  section complicating pregnancy    Pregnancy, supervision, high-risk    Advanced maternal age in multigravida    History of myomectomy    Pregnancy w/ hx of uterine myomectomy    Low back pain during pregnancy in third trimester    SI (sacroiliac) joint dysfunction       Procedure: Scheduled repeat  section     Hospital Course:  Sangeeta Paniagua is a 38 y.o. now , POD #3 who was admitted on 2023 at 37w1d for scheduled repeat  section. Patient was subsequently admitted to labor and delivery unit with signed consents.     Delivery was uncomplicated. Please see delivery note for further details. Her postpartum course was uncomplicated. On discharge day, patient's pain is controlled with oral pain medications. Pt is tolerating ambulation without SOB or CP, and regular diet without N/V. Reports lochia is mild. Denies any HA, vision changes, F/C, LE swelling. Denies any breast pain/soreness.    Pt in stable condition and ready for discharge. She has been instructed to start and/or continue medications and follow up with her obstetrics provider as listed below.    Pertinent studies:  CBC  Recent Labs   Lab 23  0614 23  1000 23  0414   WBC 9.33 13.69* 13.69*   HGB 9.4* 9.9* 8.5*   HCT 30.8* 31.6* 27.0*   MCV 79* 78* 78*    222 202        Immunization History   Administered Date(s) Administered    COVID-19, MRNA, LN-S, PF (Pfizer) (Purple Cap) 2020, 2021, 2021    HPV Quadrivalent 2011    Influenza 2017, 2017    Influenza - Quadrivalent - PF *Preferred* (6 months and older) 2015, 2018, 2019,  "2022    Tdap 2022, 10/16/2023        Delivery:    Episiotomy:     Lacerations:     Repair suture:     Repair # of packets:     Blood loss (ml):       Birth information:  YOB: 2023   Time of birth: 7:10 AM   Sex: female   Delivery type: , Low Transverse   Gestational Age: 37w1d     Measurements    Weight: 2760 g  Weight (lbs): 6 lb 1.4 oz  Length: 48.9 cm  Length (in): 19.25"  Head circumference: 33.7 cm  Chest circumference: 33 cm         Delivery Clinician: Delivery Providers    Delivering clinician: Justin De La Fuente IV, MD   Provider Role    Traci Carney MD Resident    Patti Howard RN Circulator    Teresa Baeza, [facility]    Sabiha Garcia RN Registered Nurse    Rochelle Lowery RN Registered Nurse             Additional  information:  Forceps:    Vacuum:    Breech:    Observed anomalies      Living?:     Apgars    Living status: Living  Apgar Component Scores:  1 min.:  5 min.:  10 min.:  15 min.:  20 min.:    Skin color:  1  1       Heart rate:  2  2       Reflex irritability:  2  2       Muscle tone:  2  2       Respiratory effort:  2  2       Total:  9  9       Apgars assigned by: TIGRE GARCIA RN         Placenta: Delivered:       appearance    Patient Instructions:   Current Discharge Medication List        START taking these medications    Details   acetaminophen (TYLENOL) 325 MG tablet Take 2 tablets (650 mg total) by mouth every 6 (six) hours as needed for Pain.  Qty: 32 tablet, Refills: 0      docusate sodium (COLACE) 100 MG capsule Take 2 capsules (200 mg total) by mouth 2 (two) times daily.  Qty: 60 capsule, Refills: 2      ibuprofen (ADVIL,MOTRIN) 800 MG tablet Take 1 tablet (800 mg total) by mouth every 6 (six) hours as needed for Pain.  Qty: 32 tablet, Refills: 0      omeprazole (PRILOSEC) 20 MG capsule Take 1 capsule (20 mg total) by mouth once daily.  Qty: 30 capsule, Refills: 11      oxyCODONE (ROXICODONE) 5 MG immediate release " tablet Take 1 tablet (5 mg total) by mouth every 4 (four) hours as needed for Pain.  Qty: 20 tablet, Refills: 0    Comments: Quantity prescribed more than 7 day supply? No             Discharge Procedure Orders   Diet Adult Regular     Lifting restrictions   Order Comments: Lifting no more than infant's weight for 6 weeks.     No driving until:   Order Comments: No driving until discontinued narcotics and no pain with stepping on brakes.     Pelvic Rest   Order Comments: Nothing in the vagina including intercourse for 6 weeks.     Notify your health care provider if you experience any of the following:  temperature >100.4     Notify your health care provider if you experience any of the following:  persistent nausea and vomiting or diarrhea     Notify your health care provider if you experience any of the following:  severe uncontrolled pain     Notify your health care provider if you experience any of the following:  redness, tenderness, or signs of infection (pain, swelling, redness, odor or green/yellow discharge around incision site)     Notify your health care provider if you experience any of the following:  severe persistent headache     Notify your health care provider if you experience any of the following:  persistent dizziness, light-headedness, or visual disturbances     Notify your health care provider if you experience any of the following:  increased confusion or weakness     Notify your health care provider if you experience any of the following:   Order Comments: Heavy vaginal bleeding saturating more than 2 pads in 1 hour.     Activity as tolerated        Follow-up Information       Justin De La Fuente IV, MD. Schedule an appointment as soon as possible for a visit in 6 week(s).    Specialties: Obstetrics, Obstetrics and Gynecology  Why: For postpartum visit  Contact information:  [address] [place] [address] [id number] [phone]                              Brandy Galvan MD  OB/GYN PGY1

## 2023-12-09 NOTE — LACTATION NOTE
23 1000   Maternal Assessment   Breast Shape Bilateral:;round   Breast Density Bilateral:;filling;engorged   Areola Bilateral:;elastic   Nipples Bilateral:;everted   Left Nipple Symptoms tender   Right Nipple Symptoms tender   Maternal Infant Feeding   Maternal Emotional State assist needed;anxious   Infant Positioning clutch/football;cross-cradle   Signs of Milk Transfer audible swallow;infant jaw motion present   Pain with Feeding yes   Pain Location nipples, bilateral  (initial soreness, improves)   Pain Description soreness   Nipple Shape After Feeding, Left round   Nipple Shape After Feeding, Right round   Latch Assistance yes   Equipment Type   Breast Pump Type double electric, personal  (Spectra and Manual pumps at home)   Community Referrals   Community Referrals support group;pediatric care provider;outpatient lactation program     Discharge lactation education reviewed with breastfeeding guide. Answered all questions.   Assisted with position and latch on both breasts, baby nurses well but needs stimulation and breast compression to maximize effectiveness due to late  age of 37 weeks. Stressed this to mom as she does not want to have to pump as much as with first child. This baby gained almost 2oz, yellow stools, mom's milk is in. Continue nursing on cue 8 or more times a day, pump PRN.

## 2023-12-09 NOTE — PLAN OF CARE
Pt ambulating, voiding, and passing flatus. Pt tolerating PO well and no SS of distress at this time. Pt's pain well controlled throughout shift by oral pain medication. Pts bleeding has been light throughout shift. Fundus is firm. Mother baby care guide reviewed. All questions answered. Pt verbalized understanding to follow up with OB 2-4 weeks. Reviewed medication list, when to call provider, and SS of infection. Pt stable at this time. ID band verified. All safety measures in place.

## 2023-12-09 NOTE — PROGRESS NOTES
"POSTPARTUM PROGRESS NOTE    Subjective:     PPD/POD#: 3   Procedure: Repeat LTCS   EGA: 37w1d   N/V: No   F/C: No   Abd Pain: Mild, well-controlled with oral pain medication   Lochia: Mild   Voiding: Yes   Ambulating: Yes   Bowel fnc: Yes   Contraception: Declines       Objective:      Temp:  [98.1 °F (36.7 °C)-98.7 °F (37.1 °C)] 98.7 °F (37.1 °C)  Pulse:  [78-97] 85  Resp:  [16-18] 16  SpO2:  [97 %-100 %] 97 %  BP: (109-116)/(50-58) 115/57    Abdomen: Soft, appropriately tender   Uterus: Firm, no fundal tenderness   Incision: Bandage in place without shadowing     Lab Review    No results for input(s): "NA", "K", "CL", "CO2", "BUN", "CREATININE", "GLU", "PROT", "BILITOT", "ALKPHOS", "ALT", "AST", "MG", "PHOS" in the last 168 hours.    Recent Labs   Lab 23  0614 23  1000 23  0414   WBC 9.33 13.69* 13.69*   HGB 9.4* 9.9* 8.5*   HCT 30.8* 31.6* 27.0*   MCV 79* 78* 78*    222 202           I/O  No intake or output data in the 24 hours ending 23 0803       Assessment and Plan:   37 yo  POD#1 s/p RLTCS c/b acute on chronic anemia    Postpartum care:  - Patient doing well and meeting appropriate milestones  - Ambulating, voiding, tolerating PO  - Baby at bedside, doing well  - Continue routine management and advances.    2.   Acute on chronic anemia  - H/H as above  - QBL: 868mL  - asymptomatic  - s/p cytotec series & methergine  - on venofer while inpatient     3.   AMA  - Encourage ambulation  - PP Lovenox not indicated    4. Constipation  - currently on miralax BID, patient requests this to be uptitrated  - bisacodyl suppository PRN  - Passing gas    Betty Duncan MD PGY-2  Obstetrics and Gynecology      "

## 2023-12-10 LAB
BLD PROD TYP BPU: NORMAL
BLD PROD TYP BPU: NORMAL
BLOOD UNIT EXPIRATION DATE: NORMAL
BLOOD UNIT EXPIRATION DATE: NORMAL
BLOOD UNIT TYPE CODE: 6200
BLOOD UNIT TYPE CODE: 6200
BLOOD UNIT TYPE: NORMAL
BLOOD UNIT TYPE: NORMAL
CODING SYSTEM: NORMAL
CODING SYSTEM: NORMAL
CROSSMATCH INTERPRETATION: NORMAL
CROSSMATCH INTERPRETATION: NORMAL
DISPENSE STATUS: NORMAL
DISPENSE STATUS: NORMAL
NUM UNITS TRANS PACKED RBC: NORMAL
NUM UNITS TRANS PACKED RBC: NORMAL

## 2023-12-27 ENCOUNTER — PATIENT MESSAGE (OUTPATIENT)
Dept: MATERNAL FETAL MEDICINE | Facility: CLINIC | Age: 38
End: 2023-12-27
Payer: COMMERCIAL

## 2024-02-01 ENCOUNTER — OFFICE VISIT (OUTPATIENT)
Dept: OBSTETRICS AND GYNECOLOGY | Facility: CLINIC | Age: 39
End: 2024-02-01
Payer: COMMERCIAL

## 2024-02-01 VITALS
SYSTOLIC BLOOD PRESSURE: 110 MMHG | BODY MASS INDEX: 27.22 KG/M2 | DIASTOLIC BLOOD PRESSURE: 60 MMHG | HEIGHT: 71 IN | WEIGHT: 194.44 LBS

## 2024-02-01 PROBLEM — O34.29 PREGNANCY W/ HX OF UTERINE MYOMECTOMY: Status: RESOLVED | Noted: 2023-07-13 | Resolved: 2024-02-01

## 2024-02-01 PROBLEM — O09.90 PREGNANCY, SUPERVISION, HIGH-RISK: Status: RESOLVED | Noted: 2023-07-13 | Resolved: 2024-02-01

## 2024-02-01 PROBLEM — O09.529 ADVANCED MATERNAL AGE IN MULTIGRAVIDA: Status: RESOLVED | Noted: 2023-07-13 | Resolved: 2024-02-01

## 2024-02-01 PROCEDURE — 99999 PR PBB SHADOW E&M-EST. PATIENT-LVL III: CPT | Mod: PBBFAC,,, | Performed by: OBSTETRICS & GYNECOLOGY

## 2024-02-01 PROCEDURE — 1159F MED LIST DOCD IN RCRD: CPT | Mod: CPTII,S$GLB,, | Performed by: OBSTETRICS & GYNECOLOGY

## 2024-02-01 PROCEDURE — 3074F SYST BP LT 130 MM HG: CPT | Mod: CPTII,S$GLB,, | Performed by: OBSTETRICS & GYNECOLOGY

## 2024-02-01 PROCEDURE — 3078F DIAST BP <80 MM HG: CPT | Mod: CPTII,S$GLB,, | Performed by: OBSTETRICS & GYNECOLOGY

## 2024-02-01 PROCEDURE — 0503F POSTPARTUM CARE VISIT: CPT | Mod: CPTII,S$GLB,, | Performed by: OBSTETRICS & GYNECOLOGY

## 2024-03-20 ENCOUNTER — HOSPITAL ENCOUNTER (OUTPATIENT)
Dept: RADIOLOGY | Facility: HOSPITAL | Age: 39
Discharge: HOME | End: 2024-03-20
Attending: ORTHOPAEDIC SURGERY
Payer: COMMERCIAL

## 2024-03-20 ENCOUNTER — OFFICE VISIT (OUTPATIENT)
Dept: ORTHOPEDICS | Facility: CLINIC | Age: 39
End: 2024-03-20
Payer: COMMERCIAL

## 2024-03-20 VITALS — WEIGHT: 194.44 LBS | HEIGHT: 71 IN | BODY MASS INDEX: 27.22 KG/M2

## 2024-03-20 DIAGNOSIS — M25.552 HIP PAIN, LEFT: ICD-10-CM

## 2024-03-20 DIAGNOSIS — M25.552 HIP PAIN, LEFT: Primary | ICD-10-CM

## 2024-03-20 DIAGNOSIS — M76.02 GLUTEAL TENDINITIS, LEFT HIP: Primary | ICD-10-CM

## 2024-03-20 PROCEDURE — 3008F BODY MASS INDEX DOCD: CPT | Mod: CPTII,S$GLB,, | Performed by: ORTHOPAEDIC SURGERY

## 2024-03-20 PROCEDURE — 99203 OFFICE O/P NEW LOW 30 MIN: CPT | Mod: S$GLB,,, | Performed by: ORTHOPAEDIC SURGERY

## 2024-03-20 PROCEDURE — 73502 X-RAY EXAM HIP UNI 2-3 VIEWS: CPT | Mod: TC,LT

## 2024-03-20 PROCEDURE — 99999 PR PBB SHADOW E&M-EST. PATIENT-LVL III: CPT | Mod: PBBFAC,,, | Performed by: ORTHOPAEDIC SURGERY

## 2024-03-20 PROCEDURE — 1159F MED LIST DOCD IN RCRD: CPT | Mod: CPTII,S$GLB,, | Performed by: ORTHOPAEDIC SURGERY

## 2024-03-20 PROCEDURE — 73502 X-RAY EXAM HIP UNI 2-3 VIEWS: CPT | Mod: 26,LT,, | Performed by: RADIOLOGY

## 2024-03-20 PROCEDURE — 1160F RVW MEDS BY RX/DR IN RCRD: CPT | Mod: CPTII,S$GLB,, | Performed by: ORTHOPAEDIC SURGERY

## 2024-03-20 RX ORDER — MELOXICAM 15 MG/1
15 TABLET ORAL DAILY
Qty: 30 TABLET | Refills: 1 | Status: SHIPPED | OUTPATIENT
Start: 2024-03-20

## 2024-03-24 PROBLEM — M76.02 GLUTEAL TENDINITIS, LEFT HIP: Status: ACTIVE | Noted: 2024-03-24

## 2024-03-24 NOTE — PROGRESS NOTES
Subjective:       Patient ID: Sangeeta Paniagua is a 38 y.o. female.    Chief Complaint:   Pain of the Left Hip  She comes in for initial consultation and advice regarding pain about the left hip area.  She is a GI physician.  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  She had baby 2.  Six months later which maintained the symptoms.  She has been trying to exercise but she has pain when lying on the left side and when trying to sleep at night.  No fall, accident, injury, history of pertinent surgery.  No knee pain, distal numbness or tingling.      Past Medical History:   Diagnosis Date    Constipation     Heart palpitations 2021    Pregnancy with history of uterine myomectomy 2022    Uterine fibroids affecting pregnancy      Past Surgical History:   Procedure Laterality Date     SECTION N/A 2023    Procedure:  SECTION;  Surgeon: Justin De La Fuente IV, MD;  Location: [facility]&D;  Service: OB/GYN;  Laterality: N/A;    MYOMECTOMY  2016     Family History   Problem Relation Age of Onset    Breast cancer Maternal Grandmother 75    Colon cancer Neg Hx     Ovarian cancer Neg Hx      Social History     Socioeconomic History    Marital status:    Tobacco Use    Smoking status: Never    Smokeless tobacco: Never   Substance and Sexual Activity    Alcohol use: Never    Drug use: Never    Sexual activity: Yes     Partners: Male     Comment:   - Paz Paniagua     Social Determinants of Health     Financial Resource Strain: Low Risk  (2023)    Overall Financial Resource Strain (CARDIA)     Difficulty of Paying Living Expenses: Not hard at all   Food Insecurity: No Food Insecurity (2023)    Hunger Vital Sign     Worried About Running Out of Food in the Last Year: Never true     Ran Out of Food in the Last Year: Never true   Transportation Needs: No Transportation Needs (2023)    PRAPARE - Transportation     Lack of Transportation  (Medical): No     Lack of Transportation (Non-Medical): No   Physical Activity: Sufficiently Active (12/6/2023)    Exercise Vital Sign     Days of Exercise per Week: 4 days     Minutes of Exercise per Session: 60 min   Stress: No Stress Concern Present (12/6/2023)    [employer] - Occupational Stress Questionnaire     Feeling of Stress : Only a little   Social Connections: Socially Integrated (12/6/2023)    Social Connection and Isolation Panel [NHANES]     Frequency of Communication with Friends and Family: More than three times a week     Frequency of Social Gatherings with Friends and Family: More than three times a week     Attends Baptism Services: More than 4 times per year     Active Member of Clubs or Organizations: Yes     Attends Club or Organization Meetings: More than 4 times per year     Marital Status:    Housing Stability: Low Risk  (12/6/2023)    Housing Stability Vital Sign     Unable to Pay for Housing in the Last Year: No     Number of Places Lived in the Last Year: 1     Unstable Housing in the Last Year: No       Current Outpatient Medications   Medication Sig Dispense Refill    acetaminophen (TYLENOL) 325 MG tablet Take 2 tablets (650 mg total) by mouth every 6 (six) hours as needed for Pain. (Patient not taking: Reported on 2/1/2024) 32 tablet 0    docusate sodium (COLACE) 100 MG capsule Take 2 capsules (200 mg total) by mouth 2 (two) times daily. (Patient not taking: Reported on 2/1/2024) 60 capsule 2    ibuprofen (ADVIL,MOTRIN) 800 MG tablet Take 1 tablet (800 mg total) by mouth every 6 (six) hours as needed for Pain. (Patient not taking: Reported on 2/1/2024) 32 tablet 0    meloxicam (MOBIC) 15 MG tablet Take 1 tablet (15 mg total) by mouth once daily. 30 tablet 1    omeprazole (PRILOSEC) 20 MG capsule Take 1 capsule (20 mg total) by mouth once daily. (Patient not taking: Reported on 2/1/2024) 30 capsule 11    oxyCODONE (ROXICODONE) 5 MG immediate  "release tablet Take 1 tablet (5 mg total) by mouth every 4 (four) hours as needed for Pain. (Patient not taking: Reported on 2/1/2024) 20 tablet 0     No current facility-administered medications for this visit.     Review of patient's allergies indicates:  No Known Allergies      Objective:      Vitals:    03/20/24 1101   Weight: 88.2 kg (194 lb 7.1 oz)   Height: 5' 11" (1.803 m)     Physical Exam  On exam she has tenderness at the left greater trochanter and in the abductor muscles above that.  No tenderness at the SI joint.  Negative JINA, negative FADIR.  Negative flip test.  No tenderness in the lower back and no guarding with range of motion.  Imaging Review:   X-rays of the hip and pelvis show no significant hip arthrosis, and are essentially unremarkable  Assessment:   Gluteal tendinopathy, left hip  Plan:       She is referred to Dr. Sabiha Brar for her further evaluation and comprehensive non-operative approach to her symptoms.  The patient's pathophysiology was explained in detail with reference to x-rays, models, other visual aids as appropriate.  Treatment options were discussed in detail.  Questions were invited and answered to the patient's satisfaction.    Linden Khan MD  Orthopaedic Surgery    "

## 2024-06-04 NOTE — PROGRESS NOTES
Subjective:     Sangeeta Paniagua     Chief Complaint   Patient presents with    Left Hip - Pain    Right Hip - Pain     HPI    Sangeeta is a 39 y.o. female coming in today for left hip pain that began about 2 year(s) ago, referred by Dr. Khan. Pt also c/o right hip pain for the past 2 months. Pt. describes the pain as a 3/10 achy/pulling pain that does not radiate. There was not a fall/injury/ or trauma associated with the onset of symptoms. Pt notes 2 consecutive pregnancies, most recent child delivered by repeat  2023. Pt reports lateral hip pain and pain with lying on her side. The pain is better with rest, NSAID (meloxicam, fair relief) and worse with activity, lying on side, twisting. Pt also c/o chronic back pain after a fall about a year ago. She denies radiculopathy. Pt. Denies any other musculoskeletal complaints at this time.     Joint instability? no  Mechanical locking/clicking? no   Affecting ADL's? yes  Affecting sleep? yes    Occupation: GI physician    PAST MEDICAL HISTORY:   Past Medical History:   Diagnosis Date    Constipation     Heart palpitations 2021    Pregnancy with history of uterine myomectomy 2022    Uterine fibroids affecting pregnancy      PAST SURGICAL HISTORY:   Past Surgical History:   Procedure Laterality Date     SECTION N/A 2023    Procedure:  SECTION;  Surgeon: Justin De La Fuente IV, MD;  Location: [facility]D;  Service: OB/GYN;  Laterality: N/A;    MYOMECTOMY  2016     FAMILY HISTORY:   Family History   Problem Relation Name Age of Onset    Breast cancer Maternal Grandmother Christiana 75    Colon cancer Neg Hx      Ovarian cancer Neg Hx       SOCIAL HISTORY:   Social History     Socioeconomic History    Marital status:    Tobacco Use    Smoking status: Never    Smokeless tobacco: Never   Substance and Sexual Activity    Alcohol use: Never    Drug use: Never    Sexual activity: Yes     Partners: Male     Comment:   - Paz Paniagua      Social Determinants of Health     Financial Resource Strain: Low Risk  (12/6/2023)    Overall Financial Resource Strain (CARDIA)     Difficulty of Paying Living Expenses: Not hard at all   Food Insecurity: No Food Insecurity (12/6/2023)    Hunger Vital Sign     Worried About Running Out of Food in the Last Year: Never true     Ran Out of Food in the Last Year: Never true   Transportation Needs: No Transportation Needs (12/6/2023)    PRAPARE - Transportation     Lack of Transportation (Medical): No     Lack of Transportation (Non-Medical): No   Physical Activity: Sufficiently Active (12/6/2023)    Exercise Vital Sign     Days of Exercise per Week: 4 days     Minutes of Exercise per Session: 60 min   Stress: No Stress Concern Present (12/6/2023)    [employer] - Occupational Stress Questionnaire     Feeling of Stress : Only a little   Housing Stability: Low Risk  (12/6/2023)    Housing Stability Vital Sign     Unable to Pay for Housing in the Last Year: No     Number of Places Lived in the Last Year: 1     Unstable Housing in the Last Year: No     MEDICATIONS:   Current Outpatient Medications:     acetaminophen (TYLENOL) 325 MG tablet, Take 2 tablets (650 mg total) by mouth every 6 (six) hours as needed for Pain. (Patient not taking: Reported on 2/1/2024), Disp: 32 tablet, Rfl: 0    docusate sodium (COLACE) 100 MG capsule, Take 2 capsules (200 mg total) by mouth 2 (two) times daily. (Patient not taking: Reported on 2/1/2024), Disp: 60 capsule, Rfl: 2    ibuprofen (ADVIL,MOTRIN) 800 MG tablet, Take 1 tablet (800 mg total) by mouth every 6 (six) hours as needed for Pain. (Patient not taking: Reported on 2/1/2024), Disp: 32 tablet, Rfl: 0    meloxicam (MOBIC) 15 MG tablet, Take 1 tablet (15 mg total) by mouth once daily. (Patient not taking: Reported on 6/5/2024), Disp: 30 tablet, Rfl: 1    omeprazole (PRILOSEC) 20 MG capsule, Take 1 capsule (20 mg total) by mouth once daily., Disp: 30  "capsule, Rfl: 0    oxyCODONE (ROXICODONE) 5 MG immediate release tablet, Take 1 tablet (5 mg total) by mouth every 4 (four) hours as needed for Pain. (Patient not taking: Reported on 2024), Disp: 20 tablet, Rfl: 0    ALLERGIES: Review of patient's allergies indicates:  No Known Allergies    Reviewed office visit on 3/20/24 with Dr. Khan:  She comes in for initial consultation and advice regarding pain about the left hip area. 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 She had baby 2. Six months later which maintained the symptoms. She has been trying to exercise but she has pain when lying on the left side and when trying to sleep at night.   She is referred to Dr. Sabiha Brar for her further evaluation and comprehensive non-operative approach to her symptoms.     IMAGIN. X-ray ordered due to left hip pain. (AP pelvis and frogleg lateral  left views) taken 3/20/24.   2. X-ray images were reviewed personally by me and then directly with patient.  3. FINDINGS: Limited asymmetrical benign-appearing sclerotic change marrow space lateral left femoral neck, and non a asymmetrical hypertrophic change left superior lateral acetabulum. No fracture dislocation.   4. IMPRESSION: See above    Objective:     VITAL SIGNS: /69   Ht 5' 11" (1.803 m)   Wt 88.2 kg (194 lb 7.1 oz)   BMI 27.12 kg/m²    General    Vitals reviewed.  Constitutional: She is oriented to person, place, and time. She appears well-developed and well-nourished.   Neurological: She is alert and oriented to person, place, and time.   Psychiatric: She has a normal mood and affect. Her behavior is normal.           MUSCULOSKELETAL EXAM  HIP: left HIP  The affected hip is compared to the contralateral hip.    Observation:    There is no edema, erythema, or ecchymosis in the lumbosacral region.   There is no Trendelenburg sign on either side  + obvious pelvic obliquity while " standing (improved following OMT)    No thoracolumbar scoliosis observed.    No midline skin abnormalities.  No atrophy noted in the lower limbs.  Gait: Non-antalgic with Neutral ankle mechanics and Neutral medial arch  + lower core inhibition    ROM (* = with pain):  Passive hip flexion to 120° on left and 120° on right*  Passive hip internal rotation to 45° on left and 45° on right  Passive hip external rotation to 45° on left and 45° on right   Passive hip abduction to 45° on left and 45° on right      Tenderness To Palpation:  No tenderness at the ASIS, AIIS, PSIS, PIIS, iliac crest, pubic bones, ischial tuberosity.  No tenderness throughout the lumbar spine, iliolumbar region, or posterior pelvis.  No tenderness throughout the sacrum or piriformis  + mild tenderness over the greater trochanteric bursa on right  + tenderness at the glut attachments on the greater trochanter on right  No tenderness over proximal IT band   + tenderness over the right hip flexor musculature.  + Left TFL muscle tenderness    Strength Testing (* = with pain):  Hip flexion - 5/5 on left and 5/5 on right  Hip extension - 5/5 on left and 5/5 on right  Hip adduction - 5/5 on left and 5/5 on right  Hip abduction - 5/5 on left and 5/5 on right  Knee flexion - 5/5 on left and 5/5 on right  Knee extension - 5/5 on left and 5/5 on right  Glutaeus medius - 5-/5 on left and 5-/5 on right    Special Tests:  Standing Trendelenburg test - negative    Seated straight leg raise - negative  Supine straight leg raise - negative  Hamstring flexibility symmetric    Log roll - negative  JINA - negative  FADIR - negative  Scour test - negative  No pain with posterior hip capsule compression    ASIS compression test - negative  SI drawer test - negative   Thigh thrust test - negative       Piriformis test (Bonnet's) - negative  Ely's test - negative  Quadriceps flexibility symmetric.  Joseluis test - positive bilaterally  Barber compression test -  negative    Fulcrum test - negative    Leg lengths symmetric following OMT to the pelvis.     Neurovascular Exam:  Normal gait without Trendelenburg or antalgia.  2+ femoral, DP, and PT pulses BL.  No skin changes, no abnormal hair distribution.  Sensation intact to light touch throughout the obturator and medial/lateral/posterior femoral cutaneous nerves.    TART (Tissue texture abnormality, Asymmetry,  Restriction of motion and/or Tenderness) changes:     Thoracic Spine   T1 Neutral   T2 Neutral   T3 Neutral   T4 Neutral   T5 Neutral   T6 Neutral   T7 Neutral   T8 Neutral   T9 Neutral   T10 Neutral   T11 Neutral   T12 NS-left,R-right     Rib cage: Neutral     Lumbar Spine   L1 NS-left,R-right   L2 NS-left,R-right   L3 Neutral   L4 FRS LEFT   L5 FRS LEFT     Pelvis:  Innominate:Right superior shear  Pubic bone:Right superior pubic shear    Sacrum:Right unilateral extension    Lower extremity: Right  anterior psoas tender point, left gluteal tendon region Herniated trigger point (HTP) fascial distortion, right TFL Herniated trigger point (HTP) fascial distortion, Right anterior talus    Key   F= Flexed   E = Extended   R = Rotated   S = Sidebent   TTA = tissue texture abnormality     Assessment:      Encounter Diagnoses   Name Primary?    Gluteal tendonitis of right buttock Yes    Psoas tendonitis of right side     Lateral pain of left hip     Myalgia     Somatic dysfunction of lumbar region     Sacral region somatic dysfunction     Somatic dysfunction of pelvic region     Somatic dysfunction of thoracic region     Somatic dysfunction of lower extremity         Plan:   1. Bilateral lateral hip pain secondary to weak gluteal muscles and poor pelvic/core stability with compensatory muscle firing patterns, including over-firing  of TFL on the left and psoas musculature on the right with associated right psoas tendon and gluteal tendon irritation on exam today.   - Continue  Mobic 15 mg p.o. q.day 14 days, then as  needed for pain control and Ice up to 20 minutes at a time prn for pain control.  Prescription given for Prilosec 20 mg p.o. q.day to take along with meloxicam to prevent any associated stomach irritation.  - OMT performed today to address biomechanical contributions to pain  - HEP started  -   Discussed proper posture at work desk  -  X-ray images of left hip taken 3/20/24 (AP pelvis and frogleg lateral  left views) showed Limited asymmetrical benign-appearing sclerotic change marrow space lateral left femoral neck, and non a asymmetrical hypertrophic change left superior lateral acetabulum. No fracture dislocation. Images were personally reviewed with patient.    2. OMT 5-6 regions. Oral consent obtained.  Reviewed benefits and potential side effects, including bruising at site of treatment and increased soreness for the next 24-48 hours. Pt. Instructed to increased water intake by 1 L today and tomorrow to help with any residual soreness.   - OMT indicated today due to signs and symptoms as well as local and remote somatic dysfunction findings and their related neurokinetic, lymphatic, fascial and/or arteriovenous body connections.   - OMT techniques used: Myofascial Release, Muscle Energy, Counterstrain, Fascial Distortion Model, and Articulatory   - Treatment was tolerated well. Improvement noted in segmental mobility post-treatment in dysfunctional regions. There were no adverse events and no complications immediately following treatment.     3. Pt. Given the following HEP:  A)  Pelvic clock exercises given to do from the 6-12 o'clock positions:10-15 reps, twice daily. Hand out of exercise also given.   B) Seated anterior pelvic tilt exercise: rotate pelvis forward to sit on ischial tuberosities while maintaining neutral shoulder positioning. Repeat frequently throughout the day.   C) Clam shell exercises bilaterally: hold leg in abducted and externally rotated position for 5-10 seconds, repeat 5-10 times  D)  Lower abdominal muscle isotonic exercises: Rotate pelvis into the 6 o'clock position and holding it to engage lower abdominal muscles. Then lift up leg, one at a time, alternating for 10-15 reps. Repeat exercises 1-2 times daily. Handout given.   E) Pt. Given bilateral prone prop exercise to stretch bilateral psoas and anterior hip capsule. Hold stretch for 30 sec, repeat 2-3 times, twice daily. Handout given.     80512 HOME EXERCISE PROGRAM (HEP):  The patient was taught a homegoing physical therapy regimen as described above. The patient demonstrated understanding of the exercises and proper technique of their execution. This interaction took 15 minutes.     4. Follow-up in 4 weeks for reevaluation    5. Patient agreeable to today's plan and all questions were answered    This note is dictated using the M*Modal Fluency Direct word recognition program. There are word recognition mistakes that are occasionally missed on review.    Total time spent face-to face with patient counseling or coordinating care including prognosis, differential diagnosis, risks and benefits of treatment, instructions, compliance risk reductions as well as non-face-to-face time personally spent reviewing medial record, medical documentation, and coordination of care.     EST MINUTES X   [id number] 10-19    [id number] 20-29    [id number] 30-39    [id number] 40-54 x   NEW     [id number] 15-29    [id number] 30-44    [id number] 45-59    [id number] 60-74    PHONE      5-10    [id number] 11-20    [id number] 21-30

## 2024-06-05 ENCOUNTER — OFFICE VISIT (OUTPATIENT)
Dept: SPORTS MEDICINE | Facility: CLINIC | Age: 39
End: 2024-06-05
Payer: COMMERCIAL

## 2024-06-05 VITALS
BODY MASS INDEX: 27.22 KG/M2 | DIASTOLIC BLOOD PRESSURE: 69 MMHG | SYSTOLIC BLOOD PRESSURE: 101 MMHG | HEIGHT: 71 IN | WEIGHT: 194.44 LBS

## 2024-06-05 DIAGNOSIS — M25.552 LATERAL PAIN OF LEFT HIP: ICD-10-CM

## 2024-06-05 DIAGNOSIS — M99.05 SOMATIC DYSFUNCTION OF PELVIC REGION: ICD-10-CM

## 2024-06-05 DIAGNOSIS — M99.02 SOMATIC DYSFUNCTION OF THORACIC REGION: ICD-10-CM

## 2024-06-05 DIAGNOSIS — M99.06 SOMATIC DYSFUNCTION OF LOWER EXTREMITY: ICD-10-CM

## 2024-06-05 DIAGNOSIS — M99.04 SACRAL REGION SOMATIC DYSFUNCTION: ICD-10-CM

## 2024-06-05 DIAGNOSIS — M79.10 MYALGIA: ICD-10-CM

## 2024-06-05 DIAGNOSIS — M99.03 SOMATIC DYSFUNCTION OF LUMBAR REGION: ICD-10-CM

## 2024-06-05 DIAGNOSIS — M76.11 PSOAS TENDONITIS OF RIGHT SIDE: ICD-10-CM

## 2024-06-05 DIAGNOSIS — M76.01 GLUTEAL TENDONITIS OF RIGHT BUTTOCK: Primary | ICD-10-CM

## 2024-06-05 PROCEDURE — 98927 OSTEOPATH MANJ 5-6 REGIONS: CPT | Mod: S$GLB,,, | Performed by: NEUROMUSCULOSKELETAL MEDICINE & OMM

## 2024-06-05 PROCEDURE — 99999 PR PBB SHADOW E&M-EST. PATIENT-LVL III: CPT | Mod: PBBFAC,,, | Performed by: NEUROMUSCULOSKELETAL MEDICINE & OMM

## 2024-06-05 PROCEDURE — 3008F BODY MASS INDEX DOCD: CPT | Mod: CPTII,S$GLB,, | Performed by: NEUROMUSCULOSKELETAL MEDICINE & OMM

## 2024-06-05 PROCEDURE — 3078F DIAST BP <80 MM HG: CPT | Mod: CPTII,S$GLB,, | Performed by: NEUROMUSCULOSKELETAL MEDICINE & OMM

## 2024-06-05 PROCEDURE — 3074F SYST BP LT 130 MM HG: CPT | Mod: CPTII,S$GLB,, | Performed by: NEUROMUSCULOSKELETAL MEDICINE & OMM

## 2024-06-05 PROCEDURE — 97110 THERAPEUTIC EXERCISES: CPT | Mod: S$GLB,,, | Performed by: NEUROMUSCULOSKELETAL MEDICINE & OMM

## 2024-06-05 PROCEDURE — 1159F MED LIST DOCD IN RCRD: CPT | Mod: CPTII,S$GLB,, | Performed by: NEUROMUSCULOSKELETAL MEDICINE & OMM

## 2024-06-05 PROCEDURE — 99215 OFFICE O/P EST HI 40 MIN: CPT | Mod: 25,S$GLB,, | Performed by: NEUROMUSCULOSKELETAL MEDICINE & OMM

## 2024-06-05 PROCEDURE — 1160F RVW MEDS BY RX/DR IN RCRD: CPT | Mod: CPTII,S$GLB,, | Performed by: NEUROMUSCULOSKELETAL MEDICINE & OMM

## 2024-06-05 RX ORDER — OMEPRAZOLE 20 MG/1
20 CAPSULE, DELAYED RELEASE ORAL DAILY
Qty: 30 CAPSULE | Refills: 0 | Status: SHIPPED | OUTPATIENT
Start: 2024-06-05 | End: 2024-07-05

## 2024-07-09 NOTE — PROGRESS NOTES
Subjective:     Sangeeta Paniagua     Chief Complaint   Patient presents with    Left Hip - Pain    Right Hip - Pain     HPI    Sangeeta is a 39 y.o. female coming in today for right hip pain. Since last visit the pain has Improved but pt reports persistent pain in her lower back. She notes that this started 2 years ago with a fall on concrete steps while pregnant. Pt is not compliant with HEP. She does note relief with OMT, though had some expected bruising with FDM. She notes relief with meloxicam, as well as losing 15 lbs. The pain is better with rest, NSAID (meloxicam, fair relief) and worse with activity, lying on side, twisting. Pt. describes the pain as a 4/10 achy pain that does not radiate. There has not been any new a fall/injury/ or traumas since last visit.  Pt. denies any new musculoskeletal complaints at this time.     Office note from 24 reviewed    Joint instability? no  Mechanical locking/clicking? no   Affecting ADL's? yes  Affecting sleep? 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 L&D;  Service: OB/GYN;  Laterality: N/A;    MYOMECTOMY  2016     FAMILY HISTORY:   Family History   Problem Relation Name Age of Onset    Breast cancer Maternal Grandmother Christiana 75    Colon cancer Neg Hx      Ovarian cancer Neg Hx       SOCIAL HISTORY:   Social History     Socioeconomic History    Marital status:    Tobacco Use    Smoking status: Never    Smokeless tobacco: Never   Substance and Sexual Activity    Alcohol use: Never    Drug use: Never    Sexual activity: Yes     Partners:  Male     Comment:   - Paz Paniagua     Social Determinants of Health     Financial Resource Strain: Low Risk  (7/10/2024)    Overall Financial Resource Strain (CARDIA)     Difficulty of Paying Living Expenses: Not hard at all   Food Insecurity: No Food Insecurity (7/10/2024)    Hunger Vital Sign     Worried About Running Out of Food in the Last Year: Never true     Ran Out of Food in the Last Year: Never true   Transportation Needs: No Transportation Needs (12/6/2023)    PRAPARE - Transportation     Lack of Transportation (Medical): No     Lack of Transportation (Non-Medical): No   Physical Activity: Insufficiently Active (7/10/2024)    Exercise Vital Sign     Days of Exercise per Week: 1 day     Minutes of Exercise per Session: 30 min   Stress: Stress Concern Present (7/10/2024)    [employer] - Occupational Stress Questionnaire     Feeling of Stress : To some extent   Housing Stability: Low Risk  (12/6/2023)    Housing Stability Vital Sign     Unable to Pay for Housing in the Last Year: No     Number of Places Lived in the Last Year: 1     Unstable Housing in the Last Year: No     MEDICATIONS:   Current Outpatient Medications:     acetaminophen (TYLENOL) 325 MG tablet, Take 2 tablets (650 mg total) by mouth every 6 (six) hours as needed for Pain. (Patient not taking: Reported on 2/1/2024), Disp: 32 tablet, Rfl: 0    cyclobenzaprine (FLEXERIL) 5 MG tablet, Take 1 tablet (5 mg total) by mouth nightly as needed for Muscle spasms., Disp: 15 tablet, Rfl: 0    docusate sodium (COLACE) 100 MG capsule, Take 2 capsules (200 mg total) by mouth 2 (two) times daily. (Patient not taking: Reported on 2/1/2024), Disp: 60 capsule, Rfl: 2    ibuprofen (ADVIL,MOTRIN) 800 MG tablet, Take 1 tablet (800 mg total) by mouth every 6 (six) hours as needed for Pain. (Patient not taking: Reported on 2/1/2024), Disp: 32 tablet, Rfl: 0    meloxicam (MOBIC) 15 MG tablet, Take 1 tablet (15 mg total) by mouth  "once daily. (Patient not taking: Reported on 2024), Disp: 30 tablet, Rfl: 1    omeprazole (PRILOSEC) 20 MG capsule, Take 1 capsule (20 mg total) by mouth once daily., Disp: 30 capsule, Rfl: 0    oxyCODONE (ROXICODONE) 5 MG immediate release tablet, Take 1 tablet (5 mg total) by mouth every 4 (four) hours as needed for Pain. (Patient not taking: Reported on 2024), Disp: 20 tablet, Rfl: 0    ALLERGIES: Review of patient's allergies indicates:  No Known Allergies    IMAGIN. MRI ordered due to low back pain taken 23.   2. MRI images were reviewed personally by me  3. FINDINGS:   The distal cord/ conus demonstrates normal size and appearance.  No evidence of fracture, marrow replacement process, or spondylo-discitis.  No paraspinal masses or inflammatory changes.  Degenerative changes/ spondylosis:  L1-2 and L2-3 are unremarkable.  At L3-4, there orestes small posterior annular tear/disc protrusion (series 6, image 8), mildly narrowing the spinal canal.  No significant neural foraminal narrowing.  At L4-5, there ismild disc bulging.  No spinal canal stenosis or significant neural foraminal narrowing.  At L5-S1, there ismild disc bulging.  No spinal canal stenosis or significant neural foraminal narrowing  Mild left posterior paraspinal muscle edema/strain (series 5, image 15).  4. IMPRESSION: Small annular tear at L3-4 and paraspinal muscle strain, as above. No fractures.     Objective:     VITAL SIGNS: BP 93/71   Ht 5' 11" (1.803 m)   Wt 88.2 kg (194 lb 7.1 oz)   BMI 27.12 kg/m²    General    Vitals reviewed.  Constitutional: She is oriented to person, place, and time. She appears well-developed and well-nourished.   Neurological: She is alert and oriented to person, place, and time.   Psychiatric: She has a normal mood and affect. Her behavior is normal.           MUSCULOSKELETAL EXAM  HIP: left HIP  The affected hip is compared to the contralateral hip.    Observation:    There is no edema, erythema, " or ecchymosis in the lumbosacral region.   There is no Trendelenburg sign on either side  + obvious pelvic obliquity while standing - corrected with 3 mm heel lift  No thoracolumbar scoliosis observed.    No midline skin abnormalities.  No atrophy noted in the lower limbs.  Gait: Non-antalgic with Neutral ankle mechanics and Neutral medial arch  + lower core inhibition    ROM (* = with pain):  Passive hip flexion to 120° on left and 120° on right  Passive hip internal rotation to 45° on left* and 45° on right  Passive hip external rotation to 45° on left and 45° on right   Passive hip abduction to 45° on left and 45° on right    Tenderness To Palpation:  No tenderness at the ASIS, AIIS, PSIS, PIIS, iliac crest, pubic bones, ischial tuberosity.  No tenderness throughout the lumbar spine, iliolumbar region, or posterior pelvis.  No tenderness throughout the sacrum or piriformis  No tenderness over the greater trochanteric bursa on right  No tenderness at the glut attachments on the greater trochanter on right  No tenderness over proximal IT band   + Left gluteal muscle tenderness     Strength Testing (* = with pain):  Hip flexion - 5/5 on left and 5/5 on right  Hip extension - 5/5 on left and 5/5 on right  Hip adduction - 5/5 on left and 5/5 on right  Hip abduction - 5/5 on left and 5/5 on right  Knee flexion - 5/5 on left and 5/5 on right  Knee extension - 5/5 on left and 5/5 on right  Glutaeus medius - 5-/5 on left and 5-/5 on right with compensatory lumbar rotation    Special Tests:  Standing Trendelenburg test - negative    Seated straight leg raise - negative  Supine straight leg raise - negative  Hamstring flexibility symmetric    Log roll - negative  JINA - negative  FADIR - negative  Scour test - negative  No pain with posterior hip capsule compression    ASIS compression test - negative  SI drawer test - negative   Thigh thrust test - negative     Piriformis test (Bonnet's) - negative  Ely's test -  negative  Quadriceps flexibility symmetric.  Joseluis test - positive bilaterally  Barber compression test - negative    Fulcrum test - negative    Leg lengths still asymmetric following OMT to the pelvis - R short leg     Neurovascular Exam:  Normal gait without Trendelenburg or antalgia.  2+ femoral, DP, and PT pulses BL.  No skin changes, no abnormal hair distribution.  Sensation intact to light touch throughout the obturator and medial/lateral/posterior femoral cutaneous nerves.    TART (Tissue texture abnormality, Asymmetry,  Restriction of motion and/or Tenderness) changes:     Thoracic Spine   T1 Neutral   T2 Neutral   T3 Neutral   T4 Neutral   T5 Neutral   T6 Neutral   T7 Neutral   T8 Neutral   T9 Neutral   T10 Neutral   T11 Neutral   T12 NS-left,R-right     Rib cage: Neutral     Lumbar Spine   L1 NS-left,R-right   L2 NS-left,R-right   L3 Neutral   L4 FRS RIGHT   L5 FRS RIGHT     Pelvis:  Innominate:Right anterior rotation  Pubic bone:Right inferior pubic shear    Sacrum:Left on Right sacral torsion extension    Lower extremity: left gluteal Herniated trigger point (HTP) fascial distortion     Key   F= Flexed   E = Extended   R = Rotated   S = Sidebent   TTA = tissue texture abnormality     Assessment:      Encounter Diagnoses   Name Primary?    Chronic bilateral low back pain without sciatica Yes    Lateral pain of left hip     Myalgia     Somatic dysfunction of lumbar region     Somatic dysfunction of rib cage region     Sacral region somatic dysfunction     Somatic dysfunction of pelvic region     Somatic dysfunction of lower extremity         Plan:   1.Resolution of right hip pain, left some mild left lateral and posterior hip pain persists along with chronic low back pain, likely secondary to weak gluteal muscles and poor pelvic/core stability with compensatory muscle firing patterns.  Underlying right short-leg also likely contributing to biomechanical restrictions of the lower kinetic chain.  Recommend  starting a 3 mm right heel lift for this.  - Referral to outpatient PT ([facility]) for increase pelvic stability with core and gluteal strengthening, neuromuscular retraining, diaphragm activation, and home exercise program  - OMT performed today to address biomechanical contributions to pain  - HEP reviewed  - continue to work on proper posture at work desk  -  Continue  Mobic 15 mg p.o. q.day 14 days, then as needed for pain control and Ice up to 20 minutes at a time prn for pain control.  Prescription previously given for Prilosec 20 mg p.o. q.day to take along with meloxicam to prevent any associated stomach irritation.  - prescription refill for Flexeril 5 mg q.h.s. as needed for associated muscle spasms for her chronic low back pain  -  X-ray images of left hip taken 3/20/24 (AP pelvis and frogleg lateral  left views) showed Limited asymmetrical benign-appearing sclerotic change marrow space lateral left femoral neck, and non a asymmetrical hypertrophic change left superior lateral acetabulum. No fracture dislocation. Images were personally reviewed with patient.  - MRI images of lumbar spine taken 7/23/23 showed small annular tear at L3-4 and paraspinal muscle strain, as above. No fractures. Images were personally reviewed.    2. OMT 5-6 regions. Oral consent obtained.  Reviewed benefits and potential side effects, including bruising at site of treatment and increased soreness for the next 24-48 hours. Pt. Instructed to increased water intake by 1 L today and tomorrow to help with any residual soreness.   - OMT indicated today due to signs and symptoms as well as local and remote somatic dysfunction findings and their related neurokinetic, lymphatic, fascial and/or arteriovenous body connections.   - OMT techniques used: Myofascial Release, Muscle Energy, Counterstrain, Fascial Distortion Model, and Articulatory   - Treatment was tolerated well. Improvement noted in segmental mobility post-treatment in  dysfunctional regions. There were no adverse events and no complications immediately following treatment.     3. Recommend continuing the following HEP:  A)  Pelvic clock exercises given to do from the 6-12 o'clock positions:10-15 reps, twice daily. Hand out of exercise also given.   B) Seated anterior pelvic tilt exercise: rotate pelvis forward to sit on ischial tuberosities while maintaining neutral shoulder positioning. Repeat frequently throughout the day.   C) Clam shell exercises bilaterally: hold leg in abducted and externally rotated position for 5-10 seconds, repeat 5-10 times  D) Lower abdominal muscle isotonic exercises: Rotate pelvis into the 6 o'clock position and holding it to engage lower abdominal muscles. Then lift up leg, one at a time, alternating for 10-15 reps. Repeat exercises 1-2 times daily. Handout given.   E) Pt. Given bilateral prone prop exercise to stretch bilateral psoas and anterior hip capsule. Hold stretch for 30 sec, repeat 2-3 times, twice daily. Handout given.     [address] HOME EXERCISE PROGRAM (HEP):  The patient was taught a homegoing physical therapy regimen as described above. The patient demonstrated understanding of the exercises and proper technique of their execution. This interaction took 15 minutes.     4. Follow-up in 4 weeks for reevaluation    5. Patient agreeable to today's plan and all questions were answered    This note is dictated using the M*Modal Fluency Direct word recognition program. There are word recognition mistakes that are occasionally missed on review.

## 2024-07-10 ENCOUNTER — OFFICE VISIT (OUTPATIENT)
Dept: SPORTS MEDICINE | Facility: CLINIC | Age: 39
End: 2024-07-10
Payer: COMMERCIAL

## 2024-07-10 VITALS
BODY MASS INDEX: 27.22 KG/M2 | DIASTOLIC BLOOD PRESSURE: 71 MMHG | HEIGHT: 71 IN | SYSTOLIC BLOOD PRESSURE: 93 MMHG | WEIGHT: 194.44 LBS

## 2024-07-10 DIAGNOSIS — M99.08 SOMATIC DYSFUNCTION OF RIB CAGE REGION: ICD-10-CM

## 2024-07-10 DIAGNOSIS — M99.05 SOMATIC DYSFUNCTION OF PELVIC REGION: ICD-10-CM

## 2024-07-10 DIAGNOSIS — M79.10 MYALGIA: ICD-10-CM

## 2024-07-10 DIAGNOSIS — M99.06 SOMATIC DYSFUNCTION OF LOWER EXTREMITY: ICD-10-CM

## 2024-07-10 DIAGNOSIS — M25.552 LATERAL PAIN OF LEFT HIP: ICD-10-CM

## 2024-07-10 DIAGNOSIS — G89.29 CHRONIC BILATERAL LOW BACK PAIN WITHOUT SCIATICA: Primary | ICD-10-CM

## 2024-07-10 DIAGNOSIS — M99.04 SACRAL REGION SOMATIC DYSFUNCTION: ICD-10-CM

## 2024-07-10 DIAGNOSIS — M54.50 CHRONIC BILATERAL LOW BACK PAIN WITHOUT SCIATICA: Primary | ICD-10-CM

## 2024-07-10 DIAGNOSIS — M99.03 SOMATIC DYSFUNCTION OF LUMBAR REGION: ICD-10-CM

## 2024-07-10 PROCEDURE — 3008F BODY MASS INDEX DOCD: CPT | Mod: CPTII,S$GLB,, | Performed by: NEUROMUSCULOSKELETAL MEDICINE & OMM

## 2024-07-10 PROCEDURE — 3078F DIAST BP <80 MM HG: CPT | Mod: CPTII,S$GLB,, | Performed by: NEUROMUSCULOSKELETAL MEDICINE & OMM

## 2024-07-10 PROCEDURE — 97110 THERAPEUTIC EXERCISES: CPT | Mod: S$GLB,,, | Performed by: NEUROMUSCULOSKELETAL MEDICINE & OMM

## 2024-07-10 PROCEDURE — 99999 PR PBB SHADOW E&M-EST. PATIENT-LVL III: CPT | Mod: PBBFAC,,, | Performed by: NEUROMUSCULOSKELETAL MEDICINE & OMM

## 2024-07-10 PROCEDURE — 1159F MED LIST DOCD IN RCRD: CPT | Mod: CPTII,S$GLB,, | Performed by: NEUROMUSCULOSKELETAL MEDICINE & OMM

## 2024-07-10 PROCEDURE — 3074F SYST BP LT 130 MM HG: CPT | Mod: CPTII,S$GLB,, | Performed by: NEUROMUSCULOSKELETAL MEDICINE & OMM

## 2024-07-10 PROCEDURE — 1160F RVW MEDS BY RX/DR IN RCRD: CPT | Mod: CPTII,S$GLB,, | Performed by: NEUROMUSCULOSKELETAL MEDICINE & OMM

## 2024-07-10 PROCEDURE — 99214 OFFICE O/P EST MOD 30 MIN: CPT | Mod: 25,S$GLB,, | Performed by: NEUROMUSCULOSKELETAL MEDICINE & OMM

## 2024-07-10 PROCEDURE — 98927 OSTEOPATH MANJ 5-6 REGIONS: CPT | Mod: S$GLB,,, | Performed by: NEUROMUSCULOSKELETAL MEDICINE & OMM

## 2024-07-10 RX ORDER — CYCLOBENZAPRINE HCL 5 MG
5 TABLET ORAL NIGHTLY PRN
Qty: 15 TABLET | Refills: 0 | Status: SHIPPED | OUTPATIENT
Start: 2024-07-10 | End: 2024-07-25

## 2024-08-04 NOTE — PROGRESS NOTES
CC: Right knee pain    39 y.o. Female who presents as a new patient to me. 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 Complaint is right knee pain x 2 weeks. No specific traumatic event however her pain began after attending a wedding in [facility] where she danced apparently all day for about 7 days. Pain localizes medially.  Pain present with twisting and turning activity.  Bothersome with deep squats.  Describes a subjective fullness in the knee with pain on terminal flexion. Describes crepitus and subjective instability when ascending/descending stairs. Better with rest. Treatment thus far has included rest, activity modifications. Of note, she reports 2 pregnancies in the last 2 years with weight fluctuation. Here today to discuss diagnosis and treatment options.      She has been seen previously for lumbar pain by colleague Sabiha Brar DO.    PMHx notable for n/a.   Negative for tobacco.   Negative for diabetes. Last A1C: unknown    REVIEW OF SYSTEMS:   Constitution: Negative. Negative for chills, fever and night sweats.    Hematologic/Lymphatic: Negative for bleeding problem. Does not bruise/bleed easily.   Skin: Negative for dry skin, itching and rash.   Musculoskeletal: Negative for falls. Positive for right knee pain and muscle weakness.     All other review of symptoms were reviewed and found to be noncontributory.     PAST MEDICAL HISTORY:   Past Medical History:   Diagnosis Date    Constipation     Heart palpitations 2021    Pregnancy with history of uterine myomectomy 2022    Uterine fibroids affecting pregnancy      PAST SURGICAL HISTORY:   Past Surgical History:   Procedure Laterality Date     SECTION N/A 2023    Procedure:  SECTION;  Surgeon: Justin De La Fuente IV, MD;  Location: [facility] L&D;  Service: OB/GYN;  Laterality: N/A;    MYOMECTOMY  2016     FAMILY HISTORY:   Family History   Problem Relation Name Age of Onset    Breast cancer Maternal  Grandmother Christiana 75    Colon cancer Neg Hx      Ovarian cancer Neg Hx       SOCIAL HISTORY:   Social History     Socioeconomic History    Marital status:    Tobacco Use    Smoking status: Never    Smokeless tobacco: Never   Substance and Sexual Activity    Alcohol use: Never    Drug use: Never    Sexual activity: Yes     Partners: Male     Comment:   - Paz Paniagua     Social Determinants of Health     Financial Resource Strain: Low Risk  (7/10/2024)    Overall Financial Resource Strain (CARDIA)     Difficulty of Paying Living Expenses: Not hard at all   Food Insecurity: No Food Insecurity (7/10/2024)    Hunger Vital Sign     Worried About Running Out of Food in the Last Year: Never true     Ran Out of Food in the Last Year: Never true   Transportation Needs: No Transportation Needs (12/6/2023)    PRAPARE - Transportation     Lack of Transportation (Medical): No     Lack of Transportation (Non-Medical): No   Physical Activity: Insufficiently Active (7/10/2024)    Exercise Vital Sign     Days of Exercise per Week: 1 day     Minutes of Exercise per Session: 30 min   Stress: Stress Concern Present (7/10/2024)    [employer] - Occupational Stress Questionnaire     Feeling of Stress : To some extent   Housing Stability: Low Risk  (12/6/2023)    Housing Stability Vital Sign     Unable to Pay for Housing in the Last Year: No     Number of Places Lived in the Last Year: 1     Unstable Housing in the Last Year: No     MEDICATIONS:     Current Outpatient Medications:     acetaminophen (TYLENOL) 325 MG tablet, Take 2 tablets (650 mg total) by mouth every 6 (six) hours as needed for Pain. (Patient not taking: Reported on 2/1/2024), Disp: 32 tablet, Rfl: 0    celecoxib (CELEBREX) 200 MG capsule, Take 1 capsule (200 mg total) by mouth 2 (two) times daily., Disp: 60 capsule, Rfl: 0    docusate sodium (COLACE) 100 MG capsule, Take 2 capsules (200 mg total) by mouth 2 (two) times daily.  "(Patient not taking: Reported on 2/1/2024), Disp: 60 capsule, Rfl: 2    ibuprofen (ADVIL,MOTRIN) 800 MG tablet, Take 1 tablet (800 mg total) by mouth every 6 (six) hours as needed for Pain. (Patient not taking: Reported on 2/1/2024), Disp: 32 tablet, Rfl: 0    meloxicam (MOBIC) 15 MG tablet, Take 1 tablet (15 mg total) by mouth once daily. (Patient not taking: Reported on 6/5/2024), Disp: 30 tablet, Rfl: 1    omeprazole (PRILOSEC) 20 MG capsule, Take 1 capsule (20 mg total) by mouth once daily., Disp: 30 capsule, Rfl: 0    oxyCODONE (ROXICODONE) 5 MG immediate release tablet, Take 1 tablet (5 mg total) by mouth every 4 (four) hours as needed for Pain. (Patient not taking: Reported on 2/1/2024), Disp: 20 tablet, Rfl: 0    ALLERGIES:   Review of patient's allergies indicates:  No Known Allergies     PHYSICAL EXAMINATION:  /74   Pulse 83   Resp 18   Ht 5' 11" (1.803 m)   Wt 88.2 kg (194 lb 7.1 oz)   BMI 27.12 kg/m²   General: Well-developed well-nourished 39 y.o. femalein no acute distress   Cardiovascular: Regular rhythm by palpation of distal pulse, normal color and temperature, no concerning varicosities on symptomatic side   Lungs: No labored breathing or wheezing appreciated   Neuro: Alert and oriented ×3   Psychiatric: well oriented to person, place and time, demonstrates normal mood and affect   Skin: No rashes, lesions or ulcers, normal temperature, turgor, and texture on involved extremity    Ortho/SPM Exam  Examination of the right knee demonstrates intact extensor mechanism.  Trace effusion. Central patellar tracking. No patellar apprehension. Normal patellar mobility.  Mild tenderness over the medial patellar facet region.  Full extension. Flexion to 130.  Pain medially with forced flexion and extension.  Prominent tenderness and pain over the medial joint line.  Positive Gill's test for pain medially. Negative Lachman. Stable to varus/valgus stress testing at 0 and 30 deg. Negative posterior " drawer. Ligamentously stable.    IMAGING:  X-rays including standing, weight bearing AP and flexion bilateral knees, RIGHT knee lateral and sunrise views ordered and images reviewed by me show:    No acute findings.  No significant degenerative changes    ASSESSMENT:      ICD-10-CM ICD-9-CM   1. Acute traumatic internal derangement of right knee, initial encounter  S83.104A 836.2   2. Acute pain of right knee  M25.561 719.46     PLAN:     -Findings and treatment options were discussed with the patient.  She comes into clinic with medially based pain and a possible twisting injury to her right knee while dancing.  Exam findings concerning for possible medial meniscus tear.  There is likely some component of pain coming from the patellofemoral compartment as well.  Given her presentation, advanced imaging is needed for further assessment.  -MRI r/o internal derangement, will call with results  -Celebrex prescription provided.  -All questions answered    Procedures

## 2024-08-05 ENCOUNTER — TELEPHONE (OUTPATIENT)
Dept: SPORTS MEDICINE | Facility: CLINIC | Age: 39
End: 2024-08-05
Payer: COMMERCIAL

## 2024-08-07 ENCOUNTER — PATIENT MESSAGE (OUTPATIENT)
Dept: SPORTS MEDICINE | Facility: CLINIC | Age: 39
End: 2024-08-07
Payer: COMMERCIAL

## 2024-08-08 ENCOUNTER — OFFICE VISIT (OUTPATIENT)
Dept: SPORTS MEDICINE | Facility: CLINIC | Age: 39
End: 2024-08-08
Payer: COMMERCIAL

## 2024-08-08 ENCOUNTER — HOSPITAL ENCOUNTER (OUTPATIENT)
Dept: RADIOLOGY | Facility: HOSPITAL | Age: 39
Discharge: HOME | End: 2024-08-08
Attending: ORTHOPAEDIC SURGERY
Payer: COMMERCIAL

## 2024-08-08 VITALS
DIASTOLIC BLOOD PRESSURE: 74 MMHG | RESPIRATION RATE: 18 BRPM | BODY MASS INDEX: 27.22 KG/M2 | WEIGHT: 194.44 LBS | HEIGHT: 71 IN | SYSTOLIC BLOOD PRESSURE: 109 MMHG | HEART RATE: 83 BPM

## 2024-08-08 DIAGNOSIS — M25.561 ACUTE PAIN OF RIGHT KNEE: ICD-10-CM

## 2024-08-08 DIAGNOSIS — S83.104A ACUTE TRAUMATIC INTERNAL DERANGEMENT OF RIGHT KNEE, INITIAL ENCOUNTER: Primary | ICD-10-CM

## 2024-08-08 PROCEDURE — 3078F DIAST BP <80 MM HG: CPT | Mod: CPTII,S$GLB,, | Performed by: ORTHOPAEDIC SURGERY

## 2024-08-08 PROCEDURE — 73564 X-RAY EXAM KNEE 4 OR MORE: CPT | Mod: TC,RT

## 2024-08-08 PROCEDURE — 99214 OFFICE O/P EST MOD 30 MIN: CPT | Mod: S$GLB,,, | Performed by: ORTHOPAEDIC SURGERY

## 2024-08-08 PROCEDURE — 73564 X-RAY EXAM KNEE 4 OR MORE: CPT | Mod: 26,RT,, | Performed by: RADIOLOGY

## 2024-08-08 PROCEDURE — 3074F SYST BP LT 130 MM HG: CPT | Mod: CPTII,S$GLB,, | Performed by: ORTHOPAEDIC SURGERY

## 2024-08-08 PROCEDURE — 73562 X-RAY EXAM OF KNEE 3: CPT | Mod: TC,LT

## 2024-08-08 PROCEDURE — 3008F BODY MASS INDEX DOCD: CPT | Mod: CPTII,S$GLB,, | Performed by: ORTHOPAEDIC SURGERY

## 2024-08-08 PROCEDURE — 1159F MED LIST DOCD IN RCRD: CPT | Mod: CPTII,S$GLB,, | Performed by: ORTHOPAEDIC SURGERY

## 2024-08-08 PROCEDURE — 99999 PR PBB SHADOW E&M-EST. PATIENT-LVL IV: CPT | Mod: PBBFAC,,, | Performed by: ORTHOPAEDIC SURGERY

## 2024-08-08 PROCEDURE — 73562 X-RAY EXAM OF KNEE 3: CPT | Mod: 26,59,LT, | Performed by: RADIOLOGY

## 2024-08-08 RX ORDER — CELECOXIB 200 MG/1
200 CAPSULE ORAL 2 TIMES DAILY
Qty: 60 CAPSULE | Refills: 0 | Status: SHIPPED | OUTPATIENT
Start: 2024-08-08

## 2024-08-08 RX ORDER — CELECOXIB 200 MG/1
200 CAPSULE ORAL 2 TIMES DAILY
Qty: 60 CAPSULE | Refills: 0 | Status: SHIPPED | OUTPATIENT
Start: 2024-08-08 | End: 2024-08-08 | Stop reason: CLARIF

## 2024-08-08 RX ORDER — CELECOXIB 200 MG/1
200 CAPSULE ORAL 2 TIMES DAILY
Qty: 60 CAPSULE | Refills: 0 | Status: SHIPPED | OUTPATIENT
Start: 2024-08-08 | End: 2024-08-08

## 2024-08-15 ENCOUNTER — HOSPITAL ENCOUNTER (OUTPATIENT)
Dept: RADIOLOGY | Facility: HOSPITAL | Age: 39
Discharge: HOME | End: 2024-08-15
Payer: COMMERCIAL

## 2024-08-15 DIAGNOSIS — S83.104A ACUTE TRAUMATIC INTERNAL DERANGEMENT OF RIGHT KNEE, INITIAL ENCOUNTER: ICD-10-CM

## 2024-08-15 DIAGNOSIS — M25.561 ACUTE PAIN OF RIGHT KNEE: ICD-10-CM

## 2024-08-15 PROCEDURE — 73721 MRI JNT OF LWR EXTRE W/O DYE: CPT | Mod: 26,RT,, | Performed by: RADIOLOGY

## 2024-08-15 PROCEDURE — 73721 MRI JNT OF LWR EXTRE W/O DYE: CPT | Mod: TC,RT

## 2024-09-03 ENCOUNTER — TELEPHONE (OUTPATIENT)
Dept: SPORTS MEDICINE | Facility: CLINIC | Age: 39
End: 2024-09-03
Payer: COMMERCIAL

## 2024-09-03 NOTE — TELEPHONE ENCOUNTER
Attempted to call patient to notify her that her 9/12 appointment with Dr. Brar will have to be rescheduled due to Dr. Brar being out of office. Left call back number [phone] and notified her that she can also message us through the [facility] to reschedule.

## 2024-09-27 ENCOUNTER — PATIENT MESSAGE (OUTPATIENT)
Dept: SPORTS MEDICINE | Facility: CLINIC | Age: 39
End: 2024-09-27
Payer: COMMERCIAL

## 2024-10-07 ENCOUNTER — LAB VISIT (OUTPATIENT)
Dept: LAB | Facility: HOSPITAL | Age: 39
End: 2024-10-07
Attending: STUDENT IN AN ORGANIZED HEALTH CARE EDUCATION/TRAINING PROGRAM
Payer: COMMERCIAL

## 2024-10-07 ENCOUNTER — OFFICE VISIT (OUTPATIENT)
Dept: PRIMARY CARE CLINIC | Facility: CLINIC | Age: 39
End: 2024-10-07
Payer: COMMERCIAL

## 2024-10-07 VITALS
HEART RATE: 73 BPM | BODY MASS INDEX: 23.64 KG/M2 | HEIGHT: 71 IN | TEMPERATURE: 98 F | OXYGEN SATURATION: 100 % | DIASTOLIC BLOOD PRESSURE: 82 MMHG | WEIGHT: 168.88 LBS | SYSTOLIC BLOOD PRESSURE: 98 MMHG

## 2024-10-07 DIAGNOSIS — D64.9 ANEMIA, UNSPECIFIED TYPE: ICD-10-CM

## 2024-10-07 DIAGNOSIS — L70.9 ACNE, UNSPECIFIED ACNE TYPE: ICD-10-CM

## 2024-10-07 DIAGNOSIS — Z00.00 ANNUAL PHYSICAL EXAM: Primary | ICD-10-CM

## 2024-10-07 DIAGNOSIS — M25.561 ACUTE PAIN OF RIGHT KNEE: ICD-10-CM

## 2024-10-07 DIAGNOSIS — S83.104A ACUTE TRAUMATIC INTERNAL DERANGEMENT OF RIGHT KNEE, INITIAL ENCOUNTER: ICD-10-CM

## 2024-10-07 DIAGNOSIS — Z00.00 ANNUAL PHYSICAL EXAM: ICD-10-CM

## 2024-10-07 DIAGNOSIS — K59.00 CONSTIPATION, UNSPECIFIED CONSTIPATION TYPE: ICD-10-CM

## 2024-10-07 LAB
ALBUMIN SERPL BCP-MCNC: 4.2 G/DL (ref 3.5–5.2)
ALP SERPL-CCNC: 60 U/L (ref 55–135)
ALT SERPL W/O P-5'-P-CCNC: 11 U/L (ref 10–44)
ANION GAP SERPL CALC-SCNC: 10 MMOL/L (ref 8–16)
AST SERPL-CCNC: 16 U/L (ref 10–40)
BASOPHILS # BLD AUTO: 0.05 K/UL (ref 0–0.2)
BASOPHILS NFR BLD: 0.6 % (ref 0–1.9)
BILIRUB SERPL-MCNC: 0.6 MG/DL (ref 0.1–1)
BUN SERPL-MCNC: 11 MG/DL (ref 6–20)
CALCIUM SERPL-MCNC: 9.5 MG/DL (ref 8.7–10.5)
CHLORIDE SERPL-SCNC: 105 MMOL/L (ref 95–110)
CHOLEST SERPL-MCNC: 216 MG/DL (ref 120–199)
CHOLEST/HDLC SERPL: 4.6 {RATIO} (ref 2–5)
CO2 SERPL-SCNC: 23 MMOL/L (ref 23–29)
CREAT SERPL-MCNC: 0.8 MG/DL (ref 0.5–1.4)
DIFFERENTIAL METHOD BLD: ABNORMAL
EOSINOPHIL # BLD AUTO: 0.1 K/UL (ref 0–0.5)
EOSINOPHIL NFR BLD: 1.3 % (ref 0–8)
ERYTHROCYTE [DISTWIDTH] IN BLOOD BY AUTOMATED COUNT: 13.3 % (ref 11.5–14.5)
EST. GFR  (NO RACE VARIABLE): >60 ML/MIN/1.73 M^2
ESTIMATED AVG GLUCOSE: 97 MG/DL (ref 68–131)
FERRITIN SERPL-MCNC: 38 NG/ML (ref 20–300)
GLUCOSE SERPL-MCNC: 85 MG/DL (ref 70–110)
HBA1C MFR BLD: 5 % (ref 4–5.6)
HCT VFR BLD AUTO: 38.6 % (ref 37–48.5)
HDLC SERPL-MCNC: 47 MG/DL (ref 40–75)
HDLC SERPL: 21.8 % (ref 20–50)
HGB BLD-MCNC: 12.3 G/DL (ref 12–16)
IMM GRANULOCYTES # BLD AUTO: 0.03 K/UL (ref 0–0.04)
IMM GRANULOCYTES NFR BLD AUTO: 0.4 % (ref 0–0.5)
IRON SERPL-MCNC: 101 UG/DL (ref 30–160)
LDLC SERPL CALC-MCNC: 142.4 MG/DL (ref 63–159)
LYMPHOCYTES # BLD AUTO: 3.3 K/UL (ref 1–4.8)
LYMPHOCYTES NFR BLD: 41.7 % (ref 18–48)
MCH RBC QN AUTO: 27 PG (ref 27–31)
MCHC RBC AUTO-ENTMCNC: 31.9 G/DL (ref 32–36)
MCV RBC AUTO: 85 FL (ref 82–98)
MONOCYTES # BLD AUTO: 0.7 K/UL (ref 0.3–1)
MONOCYTES NFR BLD: 8.6 % (ref 4–15)
NEUTROPHILS # BLD AUTO: 3.8 K/UL (ref 1.8–7.7)
NEUTROPHILS NFR BLD: 47.4 % (ref 38–73)
NONHDLC SERPL-MCNC: 169 MG/DL
NRBC BLD-RTO: 0 /100 WBC
PLATELET # BLD AUTO: 270 K/UL (ref 150–450)
PMV BLD AUTO: 10.1 FL (ref 9.2–12.9)
POTASSIUM SERPL-SCNC: 3.7 MMOL/L (ref 3.5–5.1)
PROT SERPL-MCNC: 7.5 G/DL (ref 6–8.4)
RBC # BLD AUTO: 4.55 M/UL (ref 4–5.4)
SATURATED IRON: 28 % (ref 20–50)
SODIUM SERPL-SCNC: 138 MMOL/L (ref 136–145)
TOTAL IRON BINDING CAPACITY: 364 UG/DL (ref 250–450)
TRANSFERRIN SERPL-MCNC: 246 MG/DL (ref 200–375)
TRIGL SERPL-MCNC: 133 MG/DL (ref 30–150)
TSH SERPL DL<=0.005 MIU/L-ACNC: 2.44 UIU/ML (ref 0.4–4)
WBC # BLD AUTO: 7.94 K/UL (ref 3.9–12.7)

## 2024-10-07 PROCEDURE — 99999 PR PBB SHADOW E&M-EST. PATIENT-LVL IV: CPT | Mod: PBBFAC,,, | Performed by: STUDENT IN AN ORGANIZED HEALTH CARE EDUCATION/TRAINING PROGRAM

## 2024-10-07 PROCEDURE — 84443 ASSAY THYROID STIM HORMONE: CPT | Performed by: STUDENT IN AN ORGANIZED HEALTH CARE EDUCATION/TRAINING PROGRAM

## 2024-10-07 PROCEDURE — 99385 PREV VISIT NEW AGE 18-39: CPT | Mod: S$GLB,,, | Performed by: STUDENT IN AN ORGANIZED HEALTH CARE EDUCATION/TRAINING PROGRAM

## 2024-10-07 PROCEDURE — 3079F DIAST BP 80-89 MM HG: CPT | Mod: CPTII,S$GLB,, | Performed by: STUDENT IN AN ORGANIZED HEALTH CARE EDUCATION/TRAINING PROGRAM

## 2024-10-07 PROCEDURE — 83036 HEMOGLOBIN GLYCOSYLATED A1C: CPT | Performed by: STUDENT IN AN ORGANIZED HEALTH CARE EDUCATION/TRAINING PROGRAM

## 2024-10-07 PROCEDURE — 1159F MED LIST DOCD IN RCRD: CPT | Mod: CPTII,S$GLB,, | Performed by: STUDENT IN AN ORGANIZED HEALTH CARE EDUCATION/TRAINING PROGRAM

## 2024-10-07 PROCEDURE — 3008F BODY MASS INDEX DOCD: CPT | Mod: CPTII,S$GLB,, | Performed by: STUDENT IN AN ORGANIZED HEALTH CARE EDUCATION/TRAINING PROGRAM

## 2024-10-07 PROCEDURE — 83540 ASSAY OF IRON: CPT | Performed by: STUDENT IN AN ORGANIZED HEALTH CARE EDUCATION/TRAINING PROGRAM

## 2024-10-07 PROCEDURE — 85025 COMPLETE CBC W/AUTO DIFF WBC: CPT | Performed by: STUDENT IN AN ORGANIZED HEALTH CARE EDUCATION/TRAINING PROGRAM

## 2024-10-07 PROCEDURE — 1160F RVW MEDS BY RX/DR IN RCRD: CPT | Mod: CPTII,S$GLB,, | Performed by: STUDENT IN AN ORGANIZED HEALTH CARE EDUCATION/TRAINING PROGRAM

## 2024-10-07 PROCEDURE — 80061 LIPID PANEL: CPT | Performed by: STUDENT IN AN ORGANIZED HEALTH CARE EDUCATION/TRAINING PROGRAM

## 2024-10-07 PROCEDURE — 3074F SYST BP LT 130 MM HG: CPT | Mod: CPTII,S$GLB,, | Performed by: STUDENT IN AN ORGANIZED HEALTH CARE EDUCATION/TRAINING PROGRAM

## 2024-10-07 PROCEDURE — 80053 COMPREHEN METABOLIC PANEL: CPT | Performed by: STUDENT IN AN ORGANIZED HEALTH CARE EDUCATION/TRAINING PROGRAM

## 2024-10-07 PROCEDURE — 82728 ASSAY OF FERRITIN: CPT | Performed by: STUDENT IN AN ORGANIZED HEALTH CARE EDUCATION/TRAINING PROGRAM

## 2024-10-07 PROCEDURE — 36415 COLL VENOUS BLD VENIPUNCTURE: CPT | Mod: PN | Performed by: STUDENT IN AN ORGANIZED HEALTH CARE EDUCATION/TRAINING PROGRAM

## 2024-10-07 RX ORDER — LINACLOTIDE 290 UG/1
290 CAPSULE, GELATIN COATED ORAL
Qty: 30 CAPSULE | Refills: 11 | Status: SHIPPED | OUTPATIENT
Start: 2024-10-07

## 2024-10-07 RX ORDER — CELECOXIB 200 MG/1
200 CAPSULE ORAL 2 TIMES DAILY
Qty: 60 CAPSULE | Refills: 2 | Status: SHIPPED | OUTPATIENT
Start: 2024-10-07

## 2024-10-07 RX ORDER — DOCUSATE SODIUM 100 MG/1
200 CAPSULE, LIQUID FILLED ORAL 2 TIMES DAILY
Qty: 120 CAPSULE | Refills: 11 | Status: SHIPPED | OUTPATIENT
Start: 2024-10-07

## 2024-10-07 RX ORDER — CELECOXIB 200 MG/1
200 CAPSULE ORAL 2 TIMES DAILY
Qty: 60 CAPSULE | Refills: 0 | Status: CANCELLED | OUTPATIENT
Start: 2024-10-07

## 2024-10-07 RX ORDER — LINACLOTIDE 290 UG/1
290 CAPSULE, GELATIN COATED ORAL
COMMUNITY
End: 2024-10-07 | Stop reason: SDUPTHER

## 2024-10-07 RX ORDER — SPIRONOLACTONE 100 MG/1
100 TABLET, FILM COATED ORAL DAILY
Qty: 30 TABLET | Refills: 11 | Status: SHIPPED | OUTPATIENT
Start: 2024-10-07 | End: 2025-10-07

## 2024-10-07 NOTE — PROGRESS NOTES
"Office visit  Patient: Sangeeta Paniagua   10/7/2024       Assessment/Plan:       1. Annual physical exam  -     TSH; Future; Expected date: 10/07/2024  -     Hemoglobin A1C; Future; Expected date: 10/07/2024  -     Lipid Panel; Future; Expected date: 10/07/2024  -     Comprehensive Metabolic Panel; Future; Expected date: 10/07/2024  -     CBC Auto Differential; Future; Expected date: 10/07/2024        -Discussed healthy habits and recommended preventative screening    2. Constipation, unspecified constipation type  -     LINZESS 290 mcg Cap capsule; Take 1 capsule (290 mcg total) by mouth before breakfast.  Dispense: 30 capsule; Refill: 11  -     docusate sodium (COLACE) 100 MG capsule; Take 2 capsules (200 mg total) by mouth 2 (two) times daily.  Dispense: 120 capsule; Refill: 11    3. Acne, unspecified acne type  -     Ambulatory referral/consult to Dermatology; Future; Expected date: 10/14/2024        -     spironolactone (ALDACTONE) 100 MG tablet; Take 1 tablet (100 mg total) by mouth once daily.  Dispense: 30 tablet; Refill: 11    4. Anemia, unspecified type  -     IRON AND TIBC; Future; Expected date: 10/07/2024  -     FERRITIN; Future; Expected date: 10/07/2024    5. Acute pain of right knee  -     celecoxib (CELEBREX) 200 MG capsule; Take 1 capsule (200 mg total) by mouth 2 (two) times daily.  Dispense: 60 capsule; Refill: 2        Return to clinic in 1 year or sooner as needed.        CHIEF COMPLAINT: establish care and annual physical    HPI: Sangeeta Paniagua is a 39 y.o. female who presents for an annual physical.     She reports chronic constipation since childhood.  She usually takes Linzess.        Current Outpatient Medications   Medication Instructions    celecoxib (CELEBREX) 200 mg, Oral, 2 times daily    docusate sodium (COLACE) 200 mg, Oral, 2 times daily    LINZESS 290 mcg, Oral, Before breakfast    spironolactone (ALDACTONE) 100 mg, Oral, Daily       No results found for: "HGBA1C"  No results found " "for: "MICALBCREAT"  No results found for: "LDLCALC", "CHOL", "HDL", "TRIG"    Lab Results   Component Value Date    WBC 13.69 (H) 2023    HGB 8.5 (L) 2023    HGB 9.9 (L) 2023    HCT 27.0 (L) 2023    MCV 78 (L) 2023     2023    TSH 3.46 2023       Lab Results   Component Value Date    FERRITIN 16 (L) 2023    IRON 88 2023    TRANSFERRIN 266 2023    TIBC 394 2023    FESATURATED 22 2023         Past Medical History:   Diagnosis Date    Constipation     Heart palpitations 2021    Pregnancy with history of uterine myomectomy 2022    Uterine fibroids affecting pregnancy      Past Surgical History:   Procedure Laterality Date     SECTION N/A 2023    Procedure:  SECTION;  Surgeon: Justin De La Fuente IV, MD;  Location: [facility] L&D;  Service: OB/GYN;  Laterality: N/A;    MYOMECTOMY  2016       Social History     Tobacco Use    Smoking status: Never    Smokeless tobacco: Never   Substance Use Topics    Alcohol use: Never    Drug use: Never       family history includes Breast cancer (age of onset: 75) in her maternal grandmother.    Vitals:    10/07/24 1411   BP: 98/82   Pulse: 73   Temp: 98.3 °F (36.8 °C)   TempSrc: Oral   SpO2: 100%   Weight: 76.6 kg (168 lb 14 oz)   Height: 5' 11" (1.803 m)   PainSc:   6   PainLoc: Knee     Objective:   Physical Exam  Vitals reviewed.   Constitutional:       General: She is not in acute distress.     Appearance: Normal appearance. She is well-developed.   HENT:      Head: Normocephalic and atraumatic.      Right Ear: External ear normal. There is impacted cerumen.      Left Ear: Tympanic membrane, ear canal and external ear normal.      Nose: Nose normal.      Mouth/Throat:      Mouth: Mucous membranes are moist.   Eyes:      General: No scleral icterus.        Right eye: No discharge.         Left eye: No discharge.      Conjunctiva/sclera: Conjunctivae normal.   Neck:      Thyroid: No " thyromegaly or thyroid tenderness.   Cardiovascular:      Rate and Rhythm: Normal rate and regular rhythm.      Heart sounds: Normal heart sounds. No murmur heard.     No friction rub. No gallop.   Pulmonary:      Effort: Pulmonary effort is normal. No respiratory distress.      Breath sounds: Normal breath sounds. No wheezing, rhonchi or rales.   Musculoskeletal:         General: No deformity.      Right lower leg: No edema.      Left lower leg: No edema.   Lymphadenopathy:      Cervical: No cervical adenopathy.   Skin:     General: Skin is warm and dry.   Neurological:      General: No focal deficit present.      Mental Status: She is alert and oriented to person, place, and time.   Psychiatric:         Mood and Affect: Mood normal.         Behavior: Behavior normal.         Thought Content: Thought content normal.             Keri Galindo MD  Internal Medicine and Pediatrics

## 2024-11-14 ENCOUNTER — CLINICAL SUPPORT (OUTPATIENT)
Dept: REHABILITATION | Facility: HOSPITAL | Age: 39
End: 2024-11-14
Payer: COMMERCIAL

## 2024-11-14 DIAGNOSIS — G89.29 CHRONIC BILATERAL LOW BACK PAIN WITHOUT SCIATICA: ICD-10-CM

## 2024-11-14 DIAGNOSIS — R29.898 DECREASED STRENGTH OF TRUNK AND BACK: Primary | ICD-10-CM

## 2024-11-14 DIAGNOSIS — M54.50 CHRONIC BILATERAL LOW BACK PAIN WITHOUT SCIATICA: ICD-10-CM

## 2024-11-14 PROCEDURE — 97161 PT EVAL LOW COMPLEX 20 MIN: CPT | Mod: PO

## 2024-11-14 PROCEDURE — 97112 NEUROMUSCULAR REEDUCATION: CPT | Mod: PO

## 2024-11-14 NOTE — PLAN OF CARE
[facility]   Physical Therapy Initial Evaluation      Name: Sangeeta Paniagua  Clinic Number: [id number]    Therapy Diagnosis:   Encounter Diagnoses   Name Primary?    Chronic bilateral low back pain without sciatica     Decreased strength of trunk and back Yes        Physician: Sabiha Brar DO    Physician Orders: PT Eval and Treat   NM retraining for core strengthening, gluteal strengthening, pelvic stability, and diaphragm activation. HEP needed.   Medical Diagnosis from Referral: M54.50,G89.29 (ICD-10-CM) - Chronic bilateral low back pain without sciatica   Evaluation Date: 11/14/2024  Authorization Period Expiration: 7/10/25  Plan of Care Expiration: 12/14/24  Progress Note Due: 12/14/24  Date of Surgery: none for back  Visit # / Visits authorized: 1/ 1   FOTO: 1/ 3    Precautions: Standard     Time In: 1108 AM  Time Out: 11:55 AM  Total Billable Time: 47 minutes    Subjective     Date of onset: chronic    History of current condition - Sangeeta reports: has had 2 pregnancies in 2 years- in 2022 and 2023. In June in 2023, she had a fall on a cement step and she store a ligament in her back- disc herniation. Since then the back pain has been daily. She has lost a lot of the weight she gained with her pregnancies which has helped. She also reports asymmetry in the hips and sees a PMNR DO who does adjustments. No numbness or tingling into the legs.    Falls: none    Imaging: MRI Lumbar Spine 2023  Impression:     Small annular tear at L3-4 and paraspinal muscle strain, as above.  No fractures.    Prior Therapy: yes one or two visits   Social History: lives with family and kids   Occupation: Work from home  Prior Level of Function: independent   Current Level of Function: more so limited by knee pain, but the weight loss has helped the back.     Pain:  Current 2/10, worst 7/10, best 0/10   Location: Low back/hips   Description: deep soreness, dull ache but can be severe   Aggravating  Factors: bending, standing, sitting prolonged  Easing Factors: celebrex for the knee helped the back too, weight loss, adjustments from the DO, flexeril once a week    Patients goals: to not be in pain     Medical History:   Past Medical History:   Diagnosis Date    Constipation     Heart palpitations 2021    Pregnancy with history of uterine myomectomy 2022    Uterine fibroids affecting pregnancy        Surgical History:   Sangeeta Paniagua  has a past surgical history that includes Myomectomy (2016) and  section (N/A, 2023).    Medications:   Sangeeta has a current medication list which includes the following prescription(s): celecoxib, docusate sodium, linzess, and spironolactone.    Allergies:   Review of patient's allergies indicates:  No Known Allergies     Objective      Observation: enters clinic independently.    Patient's right leg shorter in supine -able to partially correct with MET       Lumbar ROM: %AROM   % Pain   Flexion WNL   no   Extension WNL   no   Left Side Bending WNL Yes on R    Right Side Bending WNL  Yes L    Left rotation WNL no   Right Rotation WNL no         Sensation: normal         Lower Extremity Strength (graded 0-5 out of 5)   RLE LLE   Hip flexion: 5/5 5/5   Knee extension: 5/5 5/5   Ankle dorsiflexion: 5/5 5/5   Posterior fibers of Gluteus medius 4+/5 4+/5   Knee flexion 5/5 5/5   Hip extension: 4/5 4+/5     Special Tests: ((+): pos.; (-): neg.)      Slump Test: -  SI joint cluster: negative         Palpation for condition: TTP @ Lower lumbar spine, lumbosacral region, B PSIS, B upper glutes         Intake Outcome Measure for FOTO  Survey    Therapist reviewed FOTO scores for Sangeeta Paniagua on 2024.   FOTO report - see Media section or FOTO account episode details.    Intake Score: 63         Treatment     Total Treatment time (time-based codes) separate from Evaluation: 25 minutes     Sangeeta received the treatments listed below:          neuromuscular  "re-education activities to improve: Posture and core stabilization for 25 minutes. The following activities were included:  Hip abd iso and add iso 3 seconds each way 1 min  Pelvic MET push pull with dowel 10x10"  PPT 20x5"  90/90 heel taps 2x10  Super clam x10 each side  Dead bug 2x10        Patient Education and Home Exercises     Education provided:   - PT role and POC  - postural control  - core engagement as it relates to back pain  - home exercise program    Written Home Exercises Provided: Yes. Exercises were reviewed and Sangeeta was able to demonstrate them prior to the end of the session.  Sangeeta demonstrated good  understanding of the education provided. See EMR under Patient Instructions for exercises provided during therapy sessions.    Assessment     Sangeeta is a 39 y.o. female referred to outpatient Physical Therapy with a medical diagnosis of Chronic bilateral low back pain without sciatica . Patient presents with lumbopelvic weakness. She presents with weakness at core and proximal glutes and  right side innominate rotation which is partially corrected by MET. She is educated on extensive home exercise program today and encouraged to remain compliant to improve carryover and address deficits.     Patient prognosis is Excellent.   Patient will benefit from skilled outpatient Physical Therapy to address the deficits stated above and in the chart below, provide patient /family education, and to maximize patientt's level of independence.     Plan of care discussed with patient: Yes  Patient's spiritual, cultural and educational needs considered and patient is agreeable to the plan of care and goals as stated below:     Anticipated Barriers for therapy: none    Medical Necessity is demonstrated by the following  History  Co-morbidities and personal factors that may impact the plan of care [x] LOW: no personal factors / co-morbidities  [] MODERATE: 1-2 personal factors / co-morbidities  [] HIGH: 3+ " personal factors / co-morbidities    Moderate / High Support Documentation:   Co-morbidities affecting plan of care:     Personal Factors:   no deficits     Examination  Body Structures and Functions, activity limitations and participation restrictions that may impact the plan of care [x] LOW: addressing 1-2 elements  [] MODERATE: 3+ elements  [] HIGH: 4+ elements (please support below)    Moderate / High Support Documentation:      Clinical Presentation [x] LOW: stable  [] MODERATE: Evolving  [] HIGH: Unstable     Decision Making/ Complexity Score: low       Goals:  In 4 weeks:   1. Pt will be compliant with HEP to supplement PT in decreasing pain with functional mobility.  2. Pt will perform dead bugs and or single leg bridge with good control to demonstrate improved core strength.  3. Pt will improve gltue med and max MMTs to 5/5 to improve strength for functional tasks.  4. Pt will improve FOTO score >/=73  5. Pt to report >/=50% improvement in pain and symptoms to improve overall QoL and have less pain when caring for her children.      Plan     Plan of care Certification: 11/14/2024 to 12/14/24.    Outpatient Physical Therapy 1 times weekly for 4 weeks to include the following interventions: Manual Therapy, Moist Heat/ Ice, Neuromuscular Re-ed, Patient Education, Self Care, Therapeutic Activities, and Therapeutic Exercise.     Delmy Perkins, PT, DPT         Physician's Signature: _________________________________________ Date: ________________

## 2024-12-03 NOTE — PROGRESS NOTES
"[facility] OUTPAT[facility] THERAPY AND [facility]LL[facility]   Physical Therapy Treatment Note      Name: Sangeeta Lopezf  Clinic Number: [id number]    Therapy Diagnosis:   Encounter Diagnosis   Name Primary?    Decreased strength of trunk and back Yes       Physician: Sabiha Brar DO    Visit Date: 12/5/2024    Physician Orders: PT Eval and Treat   NM retraining for core strengthening, gluteal strengthening, pelvic stability, and diaphragm activation. HEP needed.   Medical Diagnosis from Referral: M54.50,G89.29 (ICD-10-CM) - Chronic bilateral low back pain without sciatica   Evaluation Date: 11/14/2024  Authorization Period Expiration: 12/31/24  Plan of Care Expiration: 12/14/24  Progress Note Due: 12/14/24  Date of Surgery: none for back  Visit # / Visits authorized: 1/ 10  FOTO: 1/ 3     Precautions: Standard      Time In: 10:07 AM  Time Out: 10:45 AM  Total Billable Time: 19 min 1:1 - [facility]       Subjective     Patient reports: increased pain. She traveled to [place] twice. Did not do exercises.  She was not compliant with home exercise program.  Response to previous treatment: sore, and realigned   Functional change: ongoing    Pain: 4/10  Location: low back and buttock/sides    Objective      Objective Measures updated at progress report unless specified.     Treatment     Sangeeta received the treatments listed below:            neuromuscular re-education activities to improve: Posture and core stabilization for 38 minutes. The following activities were included:  Hip abd iso and add iso 3 seconds each way 1 min  Pelvic MET push pull with dowel 10x10"  PPT 20x5"  +single leg bridge 2x10 each  90/90 heel taps 2x10  Super clam 2x10 each side  Dead bug 5x4  +half kneeling chops 2x10 7# at free motion  +standing abdominal bracing with hip abduction 2x10  +standing abdominal bracing with hip extension 2x10           Patient Education and Home Exercises       Education provided:   - HEP compliance    Written Home Exercises " Provided: Pt instructed to continue prior HEP. Exercises were reviewed and Sangeeta was able to demonstrate them prior to the end of the session.  Sangeeta demonstrated good  understanding of the education provided. See Electronic Medical Record under Patient Instructions for exercises provided during therapy sessions    Assessment     This is Sangeeta's first treatment visit since evaluation. She reports she has been traveling and has not been doing any of her exercises. Therefore, we continued with previously established interventions but did add some progressions today. All interventions are focused on core engagement and control. She is appropriately challenged by session. She is educated on importance of  compliance with previously issued home exercises to improve carryover. She confirms she will attend next scheduled visit.    Sangeeta Is progressing well towards her goals.   Patient prognosis is Excellent.     Patient will continue to benefit from skilled outpatient physical therapy to address the deficits listed in the problem list box on initial evaluation, provide pt/family education and to maximize pt's level of independence in the home and community environment.     Patient's spiritual, cultural and educational needs considered and pt agreeable to plan of care and goals.     Anticipated barriers to physical therapy: compliance with HEP and with attendance     Goals- PROGRESSING  In 4 weeks:   1. Pt will be compliant with HEP to supplement PT in decreasing pain with functional mobility.  2. Pt will perform dead bugs and or single leg bridge with good control to demonstrate improved core strength.  3. Pt will improve gltue med and max MMTs to 5/5 to improve strength for functional tasks.  4. Pt will improve FOTO score >/=73  5. Pt to report >/=50% improvement in pain and symptoms to improve overall QoL and have less pain when caring for her children.    Plan     Continue PT POC    Delmy Perkins, PT

## 2024-12-05 ENCOUNTER — CLINICAL SUPPORT (OUTPATIENT)
Dept: REHABILITATION | Facility: HOSPITAL | Age: 39
End: 2024-12-05
Payer: COMMERCIAL

## 2024-12-05 DIAGNOSIS — R29.898 DECREASED STRENGTH OF TRUNK AND BACK: Primary | ICD-10-CM

## 2024-12-05 PROCEDURE — 97112 NEUROMUSCULAR REEDUCATION: CPT | Mod: PO

## 2025-03-07 ENCOUNTER — OFFICE VISIT (OUTPATIENT)
Dept: PRIMARY CARE CLINIC | Facility: CLINIC | Age: 40
End: 2025-03-07
Payer: COMMERCIAL

## 2025-03-07 VITALS
BODY MASS INDEX: 23.48 KG/M2 | HEIGHT: 71 IN | WEIGHT: 167.75 LBS | OXYGEN SATURATION: 100 % | SYSTOLIC BLOOD PRESSURE: 106 MMHG | HEART RATE: 83 BPM | DIASTOLIC BLOOD PRESSURE: 68 MMHG

## 2025-03-07 DIAGNOSIS — K59.00 CONSTIPATION, UNSPECIFIED CONSTIPATION TYPE: ICD-10-CM

## 2025-03-07 DIAGNOSIS — M25.522 LEFT ELBOW PAIN: Primary | ICD-10-CM

## 2025-03-07 DIAGNOSIS — Z12.31 ENCOUNTER FOR SCREENING MAMMOGRAM FOR MALIGNANT NEOPLASM OF BREAST: ICD-10-CM

## 2025-03-07 PROCEDURE — 99999 PR PBB SHADOW E&M-EST. PATIENT-LVL IV: CPT | Mod: PBBFAC,,, | Performed by: STUDENT IN AN ORGANIZED HEALTH CARE EDUCATION/TRAINING PROGRAM

## 2025-03-07 RX ORDER — FAMOTIDINE 20 MG/1
20 TABLET, FILM COATED ORAL NIGHTLY PRN
Qty: 30 TABLET | Refills: 5 | Status: SHIPPED | OUTPATIENT
Start: 2025-03-07

## 2025-03-07 RX ORDER — DOCUSATE SODIUM 100 MG/1
200 CAPSULE, LIQUID FILLED ORAL 2 TIMES DAILY
Qty: 120 CAPSULE | Refills: 11 | Status: SHIPPED | OUTPATIENT
Start: 2025-03-07

## 2025-03-07 RX ORDER — POLYETHYLENE GLYCOL 3350 17 G/17G
17 POWDER, FOR SOLUTION ORAL DAILY
Qty: 1700 G | Refills: 1 | Status: SHIPPED | OUTPATIENT
Start: 2025-03-07

## 2025-03-07 NOTE — PROGRESS NOTES
"Office visit  Patient: Sangeeta Paniagua   3/7/2025       Assessment/Plan:       1. Left elbow pain  -    unclear etiology; suspect inflammatory process/tendinitis  -continue NSAIDs for another week, as well as rest  -if pain persists, will consider ortho referral    2. Encounter for screening mammogram for malignant neoplasm of breast  -     Mammo Digital Screening Bilat w/ Spike (XPD); Future; Expected date: 03/07/2025    3. Constipation, unspecified constipation type  -     docusate sodium (COLACE) 100 MG capsule; Take 2 capsules (200 mg total) by mouth 2 (two) times daily.  Dispense: 120 capsule; Refill: 11    Other orders  -     famotidine (PEPCID) 20 MG tablet; Take 1 tablet (20 mg total) by mouth nightly as needed for Heartburn.  Dispense: 30 tablet; Refill: 5  -     polyethylene glycol (GLYCOLAX) 17 gram/dose powder; Take 17 g by mouth once daily.  Dispense: 1700 g; Refill: 1      Return to clinic in October 2025 for annual physical or sooner as needed.          CHIEF COMPLAINT: elbow pain    HPI: Sangeeta Paniagua is a 39 y.o. female who presents for left elbow pain.  She reports it feels feels like it's the tip of her elbow.  She's been taking Celebrex, which helped.  She woke up one day and it was an 8/10.  She can point to one particular spot that hurts.        Current Outpatient Medications   Medication Instructions    celecoxib (CELEBREX) 200 mg, Oral, 2 times daily    docusate sodium (COLACE) 200 mg, Oral, 2 times daily    famotidine (PEPCID) 20 mg, Oral, Nightly PRN    LINZESS 290 mcg, Oral, Before breakfast    polyethylene glycol (GLYCOLAX) 17 g, Oral, Daily    spironolactone (ALDACTONE) 100 mg, Oral, Daily       Lab Results   Component Value Date    HGBA1C 5.0 10/07/2024     No results found for: "MICALBCREAT"  Lab Results   Component Value Date    LDLCALC 142.4 10/07/2024    CHOL 216 (H) 10/07/2024    HDL 47 10/07/2024    TRIG 133 10/07/2024       Lab Results   Component Value Date     10/07/2024 " "   K 3.7 10/07/2024     10/07/2024    CO2 23 10/07/2024    GLU 85 10/07/2024    BUN 11 10/07/2024    CREATININE 0.8 10/07/2024    CALCIUM 9.5 10/07/2024    PROT 7.5 10/07/2024    ALBUMIN 4.2 10/07/2024    BILITOT 0.6 10/07/2024    ALKPHOS 60 10/07/2024    AST 16 10/07/2024    ALT 11 10/07/2024    ANIONGAP 10 10/07/2024    WBC 7.94 10/07/2024    HGB 12.3 10/07/2024    HGB 8.5 (L) 2023    HCT 38.6 10/07/2024    MCV 85 10/07/2024     10/07/2024    TSH 2.436 10/07/2024       Lab Results   Component Value Date    FERRITIN 38 10/07/2024    IRON 101 10/07/2024    TRANSFERRIN 246 10/07/2024    TIBC 364 10/07/2024    FESATURATED 28 10/07/2024         Past Medical History:   Diagnosis Date    Constipation     Heart palpitations 2021    Pregnancy with history of uterine myomectomy 2022    Uterine fibroids affecting pregnancy      Past Surgical History:   Procedure Laterality Date     SECTION N/A 2023    Procedure:  SECTION;  Surgeon: Justin De La Fuente IV, MD;  Location: [facility] L&D;  Service: OB/GYN;  Laterality: N/A;    MYOMECTOMY  2016       Social History[1]    family history includes Breast cancer (age of onset: 75) in her maternal grandmother; Coronary artery disease in her father; Diabetes Mellitus in her father; Hyperlipidemia in her father.    Vitals:    25 0819   BP: 106/68   Pulse: 83   SpO2: 100%   Weight: 76.1 kg (167 lb 12.3 oz)   Height: 5' 11" (1.803 m)   PainSc:   2   PainLoc: Elbow       Body mass index is 23.4 kg/m².      Objective:   Physical Exam  Vitals reviewed.   Constitutional:       General: She is not in acute distress.     Appearance: Normal appearance. She is not diaphoretic.   HENT:      Head: Normocephalic.      Right Ear: External ear normal.      Left Ear: External ear normal.   Eyes:      General: No scleral icterus.     Conjunctiva/sclera: Conjunctivae normal.   Cardiovascular:      Comments: Appears well-perfused  Pulmonary:      Effort: " Pulmonary effort is normal. No respiratory distress.   Musculoskeletal:         General: No deformity.      Left elbow: No swelling, deformity, effusion or lacerations. Normal range of motion. Tenderness present in olecranon process. No radial head, medial epicondyle or lateral epicondyle tenderness.      Cervical back: Normal range of motion.   Skin:     Findings: No lesion or rash.   Neurological:      General: No focal deficit present.      Mental Status: She is alert and oriented to person, place, and time.   Psychiatric:         Mood and Affect: Mood normal.         Behavior: Behavior normal.         Thought Content: Thought content normal.             Keri Galindo MD  Internal Medicine and Pediatrics                                 [1]   Social History  Tobacco Use    Smoking status: Never    Smokeless tobacco: Never   Substance Use Topics    Alcohol use: Never    Drug use: Never

## 2025-04-11 ENCOUNTER — OFFICE VISIT (OUTPATIENT)
Dept: PRIMARY CARE CLINIC | Facility: CLINIC | Age: 40
End: 2025-04-11
Payer: COMMERCIAL

## 2025-04-11 VITALS
OXYGEN SATURATION: 99 % | TEMPERATURE: 98 F | WEIGHT: 171.06 LBS | HEART RATE: 85 BPM | BODY MASS INDEX: 23.95 KG/M2 | DIASTOLIC BLOOD PRESSURE: 78 MMHG | HEIGHT: 71 IN | SYSTOLIC BLOOD PRESSURE: 116 MMHG

## 2025-04-11 DIAGNOSIS — J45.20 MILD INTERMITTENT ASTHMA WITHOUT COMPLICATION: ICD-10-CM

## 2025-04-11 DIAGNOSIS — J06.9 UPPER RESPIRATORY TRACT INFECTION, UNSPECIFIED TYPE: ICD-10-CM

## 2025-04-11 DIAGNOSIS — H69.91 DYSFUNCTION OF RIGHT EUSTACHIAN TUBE: Primary | ICD-10-CM

## 2025-04-11 PROCEDURE — 3074F SYST BP LT 130 MM HG: CPT | Mod: CPTII,S$GLB,, | Performed by: STUDENT IN AN ORGANIZED HEALTH CARE EDUCATION/TRAINING PROGRAM

## 2025-04-11 PROCEDURE — 3008F BODY MASS INDEX DOCD: CPT | Mod: CPTII,S$GLB,, | Performed by: STUDENT IN AN ORGANIZED HEALTH CARE EDUCATION/TRAINING PROGRAM

## 2025-04-11 PROCEDURE — 99213 OFFICE O/P EST LOW 20 MIN: CPT | Mod: S$GLB,,, | Performed by: STUDENT IN AN ORGANIZED HEALTH CARE EDUCATION/TRAINING PROGRAM

## 2025-04-11 PROCEDURE — 99999 PR PBB SHADOW E&M-EST. PATIENT-LVL III: CPT | Mod: PBBFAC,,, | Performed by: STUDENT IN AN ORGANIZED HEALTH CARE EDUCATION/TRAINING PROGRAM

## 2025-04-11 PROCEDURE — 1159F MED LIST DOCD IN RCRD: CPT | Mod: CPTII,S$GLB,, | Performed by: STUDENT IN AN ORGANIZED HEALTH CARE EDUCATION/TRAINING PROGRAM

## 2025-04-11 PROCEDURE — 1160F RVW MEDS BY RX/DR IN RCRD: CPT | Mod: CPTII,S$GLB,, | Performed by: STUDENT IN AN ORGANIZED HEALTH CARE EDUCATION/TRAINING PROGRAM

## 2025-04-11 PROCEDURE — 3078F DIAST BP <80 MM HG: CPT | Mod: CPTII,S$GLB,, | Performed by: STUDENT IN AN ORGANIZED HEALTH CARE EDUCATION/TRAINING PROGRAM

## 2025-04-11 RX ORDER — BENZONATATE 100 MG/1
100 CAPSULE ORAL 3 TIMES DAILY PRN
Qty: 30 CAPSULE | Refills: 0 | Status: SHIPPED | OUTPATIENT
Start: 2025-04-11 | End: 2025-04-21

## 2025-04-11 RX ORDER — PREDNISONE 20 MG/1
20 TABLET ORAL DAILY
Qty: 5 TABLET | Refills: 0 | Status: SHIPPED | OUTPATIENT
Start: 2025-04-11

## 2025-04-11 RX ORDER — ALBUTEROL SULFATE 90 UG/1
2 INHALANT RESPIRATORY (INHALATION) EVERY 6 HOURS PRN
Qty: 18 G | Refills: 2 | Status: SHIPPED | OUTPATIENT
Start: 2025-04-11 | End: 2026-04-11

## 2025-04-11 NOTE — PROGRESS NOTES
Office visit  Patient: Sangeeta Paniagua   4/11/2025       Assessment/Plan:       1. Dysfunction of right eustachian tube        -discussed etiology and expected course of condition        -trial antihistamine and prednisone        -if persists, consider ENT referral    2. Upper respiratory tract infection, unspecified type        -symptomatic treatment with OTC medications        -return precautions discussed    3. Mild intermittent asthma without complication  -     albuterol (PROVENTIL HFA) 90 mcg/actuation inhaler; Inhale 2 puffs into the lungs every 6 (six) hours as needed for Wheezing or Shortness of Breath. Rescue  Dispense: 18 g; Refill: 2  -     benzonatate (TESSALON) 100 MG capsule; Take 1 capsule (100 mg total) by mouth 3 (three) times daily as needed for Cough.  Dispense: 30 capsule; Refill: 0    Other orders  -     predniSONE (DELTASONE) 20 MG tablet; Take 1 tablet (20 mg total) by mouth once daily.  Dispense: 5 tablet; Refill: 0        CHIEF COMPLAINT: laryngitis and cough    HPI: Sangeeta Paniagua is a 39 y.o. female who presents for upper respiratory symptoms.  She reports everything started about a week ago with sore throat, laryngitis, cough.  She reports that for the most part, her symptoms are improved.  She reports one day of fever.  Her right ear feels full and slightly painful.  She denies nausea, vomiting, diarrhea, constipation.      Current Outpatient Medications   Medication Instructions    albuterol (PROVENTIL HFA) 90 mcg/actuation inhaler 2 puffs, Inhalation, Every 6 hours PRN, Rescue    benzonatate (TESSALON) 100 mg, Oral, 3 times daily PRN    celecoxib (CELEBREX) 200 mg, Oral, 2 times daily    docusate sodium (COLACE) 200 mg, Oral, 2 times daily    famotidine (PEPCID) 20 mg, Oral, Nightly PRN    LINZESS 290 mcg, Oral, Before breakfast    polyethylene glycol (GLYCOLAX) 17 g, Oral, Daily    predniSONE (DELTASONE) 20 mg, Oral, Daily    spironolactone (ALDACTONE) 100 mg, Oral, Daily  "      Lab Results   Component Value Date    HGBA1C 5.0 10/07/2024     No results found for: "MICALBCREAT"  Lab Results   Component Value Date    LDLCALC 142.4 10/07/2024    CHOL 216 (H) 10/07/2024    HDL 47 10/07/2024    TRIG 133 10/07/2024       Lab Results   Component Value Date     10/07/2024    K 3.7 10/07/2024     10/07/2024    CO2 23 10/07/2024    GLU 85 10/07/2024    BUN 11 10/07/2024    CREATININE 0.8 10/07/2024    CALCIUM 9.5 10/07/2024    PROT 7.5 10/07/2024    ALBUMIN 4.2 10/07/2024    BILITOT 0.6 10/07/2024    ALKPHOS 60 10/07/2024    AST 16 10/07/2024    ALT 11 10/07/2024    ANIONGAP 10 10/07/2024    WBC 7.94 10/07/2024    HGB 12.3 10/07/2024    HGB 8.5 (L) 2023    HCT 38.6 10/07/2024    MCV 85 10/07/2024     10/07/2024    TSH 2.436 10/07/2024       Lab Results   Component Value Date    FERRITIN 38 10/07/2024    IRON 101 10/07/2024    TRANSFERRIN 246 10/07/2024    TIBC 364 10/07/2024    FESATURATED 28 10/07/2024         Past Medical History:   Diagnosis Date    Constipation     Heart palpitations 2021    Pregnancy with history of uterine myomectomy 2022    Uterine fibroids affecting pregnancy      Past Surgical History:   Procedure Laterality Date     SECTION N/A 2023    Procedure:  SECTION;  Surgeon: Justin De La Fuente IV, MD;  Location: [facility] L&D;  Service: OB/GYN;  Laterality: N/A;    MYOMECTOMY  2016       Social History[1]    family history includes Breast cancer (age of onset: 75) in her maternal grandmother; Coronary artery disease in her father; Diabetes Mellitus in her father; Hyperlipidemia in her father.    Vitals:    25 0853   BP: 116/78   Pulse: 85   Temp: 98.2 °F (36.8 °C)   TempSrc: Oral   SpO2: 99%   Weight: 77.6 kg (171 lb 1.2 oz)   Height: 5' 11" (1.803 m)   PainSc: 0-No pain       Body mass index is 23.86 kg/m².      Objective:   Physical Exam  Vitals reviewed.   Constitutional:       General: She is not in acute " distress.     Appearance: Normal appearance. She is well-developed.   HENT:      Head: Normocephalic and atraumatic.      Right Ear: Tympanic membrane, ear canal and external ear normal.      Left Ear: Tympanic membrane, ear canal and external ear normal.      Nose: Nose normal.      Mouth/Throat:      Mouth: Mucous membranes are moist.      Pharynx: No oropharyngeal exudate or posterior oropharyngeal erythema.   Eyes:      General: No scleral icterus.        Right eye: No discharge.         Left eye: No discharge.      Conjunctiva/sclera: Conjunctivae normal.   Neck:      Thyroid: No thyromegaly or thyroid tenderness.   Cardiovascular:      Rate and Rhythm: Normal rate and regular rhythm.      Heart sounds: Normal heart sounds. No murmur heard.     No friction rub. No gallop.   Pulmonary:      Effort: Pulmonary effort is normal. No respiratory distress.      Breath sounds: Normal breath sounds. No wheezing, rhonchi or rales.   Musculoskeletal:         General: No deformity.      Right lower leg: No edema.      Left lower leg: No edema.   Lymphadenopathy:      Cervical: No cervical adenopathy.   Skin:     General: Skin is warm and dry.   Neurological:      General: No focal deficit present.      Mental Status: She is alert and oriented to person, place, and time.   Psychiatric:         Mood and Affect: Mood normal.         Behavior: Behavior normal.         Thought Content: Thought content normal.         Keri Galindo MD  Internal Medicine and Pediatrics                                   [1]  Social History  Tobacco Use    Smoking status: Never    Smokeless tobacco: Never   Substance Use Topics    Alcohol use: Never    Drug use: Never

## 2025-05-03 ENCOUNTER — PATIENT MESSAGE (OUTPATIENT)
Dept: OBSTETRICS AND GYNECOLOGY | Facility: CLINIC | Age: 40
End: 2025-05-03
Payer: COMMERCIAL

## 2025-05-22 ENCOUNTER — PATIENT MESSAGE (OUTPATIENT)
Dept: OBSTETRICS AND GYNECOLOGY | Facility: CLINIC | Age: 40
End: 2025-05-22
Payer: COMMERCIAL

## 2025-05-22 DIAGNOSIS — Z3A.01 LESS THAN 8 WEEKS GESTATION OF PREGNANCY: Primary | ICD-10-CM

## 2025-05-27 ENCOUNTER — PATIENT MESSAGE (OUTPATIENT)
Dept: PRIMARY CARE CLINIC | Facility: CLINIC | Age: 40
End: 2025-05-27

## 2025-05-27 DIAGNOSIS — Z32.00 POSSIBLE PREGNANCY, NOT YET CONFIRMED: Primary | ICD-10-CM

## 2025-05-27 NOTE — TELEPHONE ENCOUNTER
Pt is wondering if you would be comfortable with ordering quantitative beta HCG serum test to confirm pregnancy. Pt has taken several home pregnancy tests and she has reached out to OBGYN in another message. LOV 04/11/25. She has virtual appt w OBMARTHAN on 06/02/25 but she is leaving the country next week for a month long trip.

## 2025-05-28 ENCOUNTER — LAB VISIT (OUTPATIENT)
Dept: LAB | Facility: HOSPITAL | Age: 40
End: 2025-05-28
Attending: OBSTETRICS & GYNECOLOGY
Payer: COMMERCIAL

## 2025-05-28 DIAGNOSIS — Z3A.01 LESS THAN 8 WEEKS GESTATION OF PREGNANCY: ICD-10-CM

## 2025-05-28 LAB — HCG INTACT+B SERPL-ACNC: 338.6 MIU/ML

## 2025-05-28 PROCEDURE — 36415 COLL VENOUS BLD VENIPUNCTURE: CPT | Mod: PN

## 2025-05-28 PROCEDURE — 84702 CHORIONIC GONADOTROPIN TEST: CPT

## 2025-05-29 ENCOUNTER — RESULTS FOLLOW-UP (OUTPATIENT)
Dept: OBSTETRICS AND GYNECOLOGY | Facility: CLINIC | Age: 40
End: 2025-05-29

## 2025-05-30 ENCOUNTER — LAB VISIT (OUTPATIENT)
Dept: LAB | Facility: HOSPITAL | Age: 40
End: 2025-05-30
Attending: OBSTETRICS & GYNECOLOGY
Payer: COMMERCIAL

## 2025-05-30 DIAGNOSIS — Z3A.01 LESS THAN 8 WEEKS GESTATION OF PREGNANCY: ICD-10-CM

## 2025-05-30 LAB — HCG INTACT+B SERPL-ACNC: 356.4 MIU/ML

## 2025-05-30 PROCEDURE — 36415 COLL VENOUS BLD VENIPUNCTURE: CPT | Mod: PN

## 2025-05-30 PROCEDURE — 84702 CHORIONIC GONADOTROPIN TEST: CPT

## 2025-06-02 ENCOUNTER — ANESTHESIA EVENT (OUTPATIENT)
Dept: SURGERY | Facility: OTHER | Age: 40
End: 2025-06-02
Payer: COMMERCIAL

## 2025-06-02 ENCOUNTER — TELEPHONE (OUTPATIENT)
Dept: OBSTETRICS AND GYNECOLOGY | Facility: CLINIC | Age: 40
End: 2025-06-02

## 2025-06-02 ENCOUNTER — HOSPITAL ENCOUNTER (OUTPATIENT)
Facility: OTHER | Age: 40
Discharge: HOME | End: 2025-06-02
Attending: OBSTETRICS & GYNECOLOGY | Admitting: OBSTETRICS & GYNECOLOGY
Payer: COMMERCIAL

## 2025-06-02 ENCOUNTER — ANESTHESIA (OUTPATIENT)
Dept: SURGERY | Facility: OTHER | Age: 40
End: 2025-06-02
Payer: COMMERCIAL

## 2025-06-02 ENCOUNTER — CLINICAL SUPPORT (OUTPATIENT)
Dept: OBSTETRICS AND GYNECOLOGY | Facility: CLINIC | Age: 40
End: 2025-06-02
Payer: COMMERCIAL

## 2025-06-02 ENCOUNTER — HOSPITAL ENCOUNTER (OUTPATIENT)
Dept: PERINATAL CARE | Facility: OTHER | Age: 40
Discharge: HOME | End: 2025-06-02
Attending: OBSTETRICS & GYNECOLOGY
Payer: COMMERCIAL

## 2025-06-02 DIAGNOSIS — O03.9 SAB (SPONTANEOUS ABORTION): ICD-10-CM

## 2025-06-02 DIAGNOSIS — O20.0 THREATENED ABORTION IN EARLY PREGNANCY: Primary | ICD-10-CM

## 2025-06-02 DIAGNOSIS — Z98.890 S/P DILATION AND CURETTAGE: Primary | ICD-10-CM

## 2025-06-02 DIAGNOSIS — N91.2 AMENORRHEA: Primary | ICD-10-CM

## 2025-06-02 DIAGNOSIS — O20.0 THREATENED ABORTION IN EARLY PREGNANCY: ICD-10-CM

## 2025-06-02 LAB
ABSOLUTE EOSINOPHIL (OHS): 0.04 K/UL
ABSOLUTE MONOCYTE (OHS): 0.48 K/UL (ref 0.3–1)
ABSOLUTE NEUTROPHIL COUNT (OHS): 3.36 K/UL (ref 1.8–7.7)
BASOPHILS # BLD AUTO: 0.04 K/UL
BASOPHILS NFR BLD AUTO: 0.5 %
ERYTHROCYTE [DISTWIDTH] IN BLOOD BY AUTOMATED COUNT: 13.2 % (ref 11.5–14.5)
HCT VFR BLD AUTO: 35.6 % (ref 37–48.5)
HGB BLD-MCNC: 11.9 GM/DL (ref 12–16)
IMM GRANULOCYTES # BLD AUTO: 0.02 K/UL (ref 0–0.04)
IMM GRANULOCYTES NFR BLD AUTO: 0.3 % (ref 0–0.5)
INDIRECT COOMBS: NORMAL
LYMPHOCYTES # BLD AUTO: 3.43 K/UL (ref 1–4.8)
MCH RBC QN AUTO: 28.3 PG (ref 27–31)
MCHC RBC AUTO-ENTMCNC: 33.4 G/DL (ref 32–36)
MCV RBC AUTO: 85 FL (ref 82–98)
NUCLEATED RBC (/100WBC) (OHS): 0 /100 WBC
PLATELET # BLD AUTO: 236 K/UL (ref 150–450)
PMV BLD AUTO: 9.1 FL (ref 9.2–12.9)
RBC # BLD AUTO: 4.21 M/UL (ref 4–5.4)
RELATIVE EOSINOPHIL (OHS): 0.5 %
RELATIVE LYMPHOCYTE (OHS): 46.5 % (ref 18–48)
RELATIVE MONOCYTE (OHS): 6.5 % (ref 4–15)
RELATIVE NEUTROPHIL (OHS): 45.7 % (ref 38–73)
RH BLD: NORMAL
SPECIMEN OUTDATE: NORMAL
WBC # BLD AUTO: 7.37 K/UL (ref 3.9–12.7)

## 2025-06-02 PROCEDURE — 88342 IMHCHEM/IMCYTCHM 1ST ANTB: CPT | Mod: 26,,, | Performed by: PATHOLOGY

## 2025-06-02 PROCEDURE — 25000003 PHARM REV CODE 250

## 2025-06-02 PROCEDURE — 63600175 PHARM REV CODE 636 W HCPCS: Performed by: STUDENT IN AN ORGANIZED HEALTH CARE EDUCATION/TRAINING PROGRAM

## 2025-06-02 PROCEDURE — 25000003 PHARM REV CODE 250: Performed by: ANESTHESIOLOGY

## 2025-06-02 PROCEDURE — 71000015 HC POSTOP RECOV 1ST HR: Performed by: OBSTETRICS & GYNECOLOGY

## 2025-06-02 PROCEDURE — 86901 BLOOD TYPING SEROLOGIC RH(D): CPT | Performed by: OBSTETRICS & GYNECOLOGY

## 2025-06-02 PROCEDURE — 71000039 HC RECOVERY, EACH ADD'L HOUR: Performed by: OBSTETRICS & GYNECOLOGY

## 2025-06-02 PROCEDURE — D9220A PRA ANESTHESIA: Mod: CRNA,,, | Performed by: NURSE ANESTHETIST, CERTIFIED REGISTERED

## 2025-06-02 PROCEDURE — 71000016 HC POSTOP RECOV ADDL HR: Performed by: OBSTETRICS & GYNECOLOGY

## 2025-06-02 PROCEDURE — 36000704 HC OR TIME LEV I 1ST 15 MIN: Performed by: OBSTETRICS & GYNECOLOGY

## 2025-06-02 PROCEDURE — D9220A PRA ANESTHESIA: Mod: ANES,,, | Performed by: ANESTHESIOLOGY

## 2025-06-02 PROCEDURE — 88305 TISSUE EXAM BY PATHOLOGIST: CPT | Mod: 26,,, | Performed by: PATHOLOGY

## 2025-06-02 PROCEDURE — 36415 COLL VENOUS BLD VENIPUNCTURE: CPT | Performed by: ANESTHESIOLOGY

## 2025-06-02 PROCEDURE — 76817 TRANSVAGINAL US OBSTETRIC: CPT | Mod: 26,,, | Performed by: OBSTETRICS & GYNECOLOGY

## 2025-06-02 PROCEDURE — 36000705 HC OR TIME LEV I EA ADD 15 MIN: Performed by: OBSTETRICS & GYNECOLOGY

## 2025-06-02 PROCEDURE — 37000009 HC ANESTHESIA EA ADD 15 MINS: Performed by: OBSTETRICS & GYNECOLOGY

## 2025-06-02 PROCEDURE — 71000033 HC RECOVERY, INTIAL HOUR: Performed by: OBSTETRICS & GYNECOLOGY

## 2025-06-02 PROCEDURE — 76801 OB US < 14 WKS SINGLE FETUS: CPT

## 2025-06-02 PROCEDURE — 88342 IMHCHEM/IMCYTCHM 1ST ANTB: CPT | Mod: TC,59 | Performed by: OBSTETRICS & GYNECOLOGY

## 2025-06-02 PROCEDURE — 59820 CARE OF MISCARRIAGE: CPT | Mod: ,,, | Performed by: OBSTETRICS & GYNECOLOGY

## 2025-06-02 PROCEDURE — 63600175 PHARM REV CODE 636 W HCPCS: Mod: JZ,TB | Performed by: STUDENT IN AN ORGANIZED HEALTH CARE EDUCATION/TRAINING PROGRAM

## 2025-06-02 PROCEDURE — 63600175 PHARM REV CODE 636 W HCPCS: Performed by: ANESTHESIOLOGY

## 2025-06-02 PROCEDURE — 37000008 HC ANESTHESIA 1ST 15 MINUTES: Performed by: OBSTETRICS & GYNECOLOGY

## 2025-06-02 PROCEDURE — 85025 COMPLETE CBC W/AUTO DIFF WBC: CPT | Performed by: ANESTHESIOLOGY

## 2025-06-02 RX ORDER — FAMOTIDINE 20 MG/1
20 TABLET, FILM COATED ORAL
Status: COMPLETED | OUTPATIENT
Start: 2025-06-02 | End: 2025-06-02

## 2025-06-02 RX ORDER — ACETAMINOPHEN 500 MG
1000 TABLET ORAL
Status: COMPLETED | OUTPATIENT
Start: 2025-06-02 | End: 2025-06-02

## 2025-06-02 RX ORDER — SODIUM CHLORIDE 9 MG/ML
INJECTION, SOLUTION INTRAVENOUS CONTINUOUS
Status: DISCONTINUED | OUTPATIENT
Start: 2025-06-02 | End: 2025-06-02 | Stop reason: HOSPADM

## 2025-06-02 RX ORDER — ONDANSETRON HYDROCHLORIDE 2 MG/ML
4 INJECTION, SOLUTION INTRAVENOUS EVERY 4 HOURS PRN
Status: DISCONTINUED | OUTPATIENT
Start: 2025-06-02 | End: 2025-06-02 | Stop reason: HOSPADM

## 2025-06-02 RX ORDER — DOXYCYCLINE HYCLATE 100 MG
200 TABLET ORAL
Status: CANCELLED | OUTPATIENT
Start: 2025-06-02

## 2025-06-02 RX ORDER — SODIUM CHLORIDE 0.9 % (FLUSH) 0.9 %
2 SYRINGE (ML) INJECTION ONCE
Status: DISCONTINUED | OUTPATIENT
Start: 2025-06-02 | End: 2025-06-02 | Stop reason: HOSPADM

## 2025-06-02 RX ORDER — OXYCODONE HYDROCHLORIDE 5 MG/1
5 TABLET ORAL
Status: DISCONTINUED | OUTPATIENT
Start: 2025-06-02 | End: 2025-06-02 | Stop reason: HOSPADM

## 2025-06-02 RX ORDER — PROCHLORPERAZINE EDISYLATE 5 MG/ML
5 INJECTION INTRAMUSCULAR; INTRAVENOUS EVERY 30 MIN PRN
Status: DISCONTINUED | OUTPATIENT
Start: 2025-06-02 | End: 2025-06-02 | Stop reason: HOSPADM

## 2025-06-02 RX ORDER — LIDOCAINE HYDROCHLORIDE 20 MG/ML
INJECTION INTRAVENOUS
Status: DISCONTINUED | OUTPATIENT
Start: 2025-06-02 | End: 2025-06-02

## 2025-06-02 RX ORDER — IBUPROFEN 600 MG/1
600 TABLET, FILM COATED ORAL EVERY 6 HOURS
Qty: 60 TABLET | Refills: 1 | Status: SHIPPED | OUTPATIENT
Start: 2025-06-02

## 2025-06-02 RX ORDER — DEXAMETHASONE SODIUM PHOSPHATE 4 MG/ML
INJECTION, SOLUTION INTRA-ARTICULAR; INTRALESIONAL; INTRAMUSCULAR; INTRAVENOUS; SOFT TISSUE
Status: DISCONTINUED | OUTPATIENT
Start: 2025-06-02 | End: 2025-06-02

## 2025-06-02 RX ORDER — MUPIROCIN 20 MG/G
OINTMENT TOPICAL
Status: DISCONTINUED | OUTPATIENT
Start: 2025-06-02 | End: 2025-06-02 | Stop reason: HOSPADM

## 2025-06-02 RX ORDER — GLUCAGON 1 MG
1 KIT INJECTION
Status: DISCONTINUED | OUTPATIENT
Start: 2025-06-02 | End: 2025-06-02 | Stop reason: HOSPADM

## 2025-06-02 RX ORDER — ACETAMINOPHEN 500 MG
1000 TABLET ORAL EVERY 6 HOURS PRN
Status: DISCONTINUED | OUTPATIENT
Start: 2025-06-02 | End: 2025-06-02 | Stop reason: HOSPADM

## 2025-06-02 RX ORDER — KETOROLAC TROMETHAMINE 30 MG/ML
15 INJECTION, SOLUTION INTRAMUSCULAR; INTRAVENOUS EVERY 6 HOURS PRN
Status: DISCONTINUED | OUTPATIENT
Start: 2025-06-02 | End: 2025-06-02 | Stop reason: HOSPADM

## 2025-06-02 RX ORDER — OXYCODONE HYDROCHLORIDE 5 MG/1
5 TABLET ORAL EVERY 4 HOURS PRN
Qty: 5 TABLET | Refills: 0 | Status: SHIPPED | OUTPATIENT
Start: 2025-06-02

## 2025-06-02 RX ORDER — DOCUSATE SODIUM 100 MG/1
100 CAPSULE, LIQUID FILLED ORAL 2 TIMES DAILY
Qty: 60 CAPSULE | Refills: 1 | Status: SHIPPED | OUTPATIENT
Start: 2025-06-02

## 2025-06-02 RX ORDER — FENTANYL CITRATE 50 UG/ML
INJECTION, SOLUTION INTRAMUSCULAR; INTRAVENOUS
Status: DISCONTINUED | OUTPATIENT
Start: 2025-06-02 | End: 2025-06-02

## 2025-06-02 RX ORDER — ONDANSETRON HYDROCHLORIDE 2 MG/ML
4 INJECTION, SOLUTION INTRAVENOUS DAILY PRN
Status: DISCONTINUED | OUTPATIENT
Start: 2025-06-02 | End: 2025-06-02 | Stop reason: HOSPADM

## 2025-06-02 RX ORDER — DEXTROMETHORPHAN HYDROBROMIDE, GUAIFENESIN 5; 100 MG/5ML; MG/5ML
650 LIQUID ORAL EVERY 6 HOURS
Qty: 60 TABLET | Refills: 1 | Status: SHIPPED | OUTPATIENT
Start: 2025-06-02

## 2025-06-02 RX ORDER — ONDANSETRON HYDROCHLORIDE 2 MG/ML
INJECTION, SOLUTION INTRAVENOUS
Status: DISCONTINUED | OUTPATIENT
Start: 2025-06-02 | End: 2025-06-02

## 2025-06-02 RX ORDER — PROPOFOL 10 MG/ML
VIAL (ML) INTRAVENOUS
Status: DISCONTINUED | OUTPATIENT
Start: 2025-06-02 | End: 2025-06-02

## 2025-06-02 RX ORDER — DIPHENHYDRAMINE HYDROCHLORIDE 50 MG/ML
INJECTION, SOLUTION INTRAMUSCULAR; INTRAVENOUS
Status: DISCONTINUED
Start: 2025-06-02 | End: 2025-06-02 | Stop reason: HOSPADM

## 2025-06-02 RX ORDER — HYDROMORPHONE HYDROCHLORIDE 2 MG/ML
0.2 INJECTION, SOLUTION INTRAMUSCULAR; INTRAVENOUS; SUBCUTANEOUS
Refills: 0 | Status: DISCONTINUED | OUTPATIENT
Start: 2025-06-02 | End: 2025-06-02 | Stop reason: HOSPADM

## 2025-06-02 RX ORDER — KETOROLAC TROMETHAMINE 30 MG/ML
INJECTION, SOLUTION INTRAMUSCULAR; INTRAVENOUS
Status: DISCONTINUED | OUTPATIENT
Start: 2025-06-02 | End: 2025-06-02

## 2025-06-02 RX ORDER — DIPHENHYDRAMINE HYDROCHLORIDE 50 MG/ML
12.5 INJECTION, SOLUTION INTRAMUSCULAR; INTRAVENOUS ONCE
Status: DISCONTINUED | OUTPATIENT
Start: 2025-06-02 | End: 2025-06-02 | Stop reason: HOSPADM

## 2025-06-02 RX ORDER — HYDROMORPHONE HYDROCHLORIDE 2 MG/ML
0.4 INJECTION, SOLUTION INTRAMUSCULAR; INTRAVENOUS; SUBCUTANEOUS EVERY 5 MIN PRN
Status: DISCONTINUED | OUTPATIENT
Start: 2025-06-02 | End: 2025-06-02 | Stop reason: HOSPADM

## 2025-06-02 RX ADMIN — OXYCODONE 5 MG: 5 TABLET ORAL at 02:06

## 2025-06-02 RX ADMIN — ACETAMINOPHEN 1000 MG: 500 TABLET, FILM COATED ORAL at 12:06

## 2025-06-02 RX ADMIN — DEXAMETHASONE SODIUM PHOSPHATE 4 MG: 4 INJECTION, SOLUTION INTRAMUSCULAR; INTRAVENOUS at 01:06

## 2025-06-02 RX ADMIN — FENTANYL CITRATE 50 MCG: 50 INJECTION, SOLUTION INTRAMUSCULAR; INTRAVENOUS at 01:06

## 2025-06-02 RX ADMIN — HYDROMORPHONE HYDROCHLORIDE 0.4 MG: 2 INJECTION, SOLUTION INTRAMUSCULAR; INTRAVENOUS; SUBCUTANEOUS at 02:06

## 2025-06-02 RX ADMIN — ONDANSETRON HYDROCHLORIDE 4 MG: 2 INJECTION INTRAMUSCULAR; INTRAVENOUS at 01:06

## 2025-06-02 RX ADMIN — SODIUM CHLORIDE, SODIUM LACTATE, POTASSIUM CHLORIDE, AND CALCIUM CHLORIDE: .6; .31; .03; .02 INJECTION, SOLUTION INTRAVENOUS at 01:06

## 2025-06-02 RX ADMIN — DOXYCYCLINE 200 MG: 100 INJECTION, POWDER, LYOPHILIZED, FOR SOLUTION INTRAVENOUS at 01:06

## 2025-06-02 RX ADMIN — PROPOFOL 200 MG: 10 INJECTION, EMULSION INTRAVENOUS at 01:06

## 2025-06-02 RX ADMIN — LIDOCAINE HYDROCHLORIDE 100 MG: 20 INJECTION, SOLUTION INTRAVENOUS at 01:06

## 2025-06-02 RX ADMIN — KETOROLAC TROMETHAMINE 30 MG: 30 INJECTION, SOLUTION INTRAMUSCULAR; INTRAVENOUS at 02:06

## 2025-06-02 RX ADMIN — MUPIROCIN: 20 OINTMENT TOPICAL at 12:06

## 2025-06-02 RX ADMIN — FAMOTIDINE 20 MG: 20 TABLET, FILM COATED ORAL at 12:06

## 2025-06-02 NOTE — PLAN OF CARE
Sangeeta Paniagua has met all discharge criteria from Phase II. Vital Signs are stable, ambulating  without difficulty. Discharge instructions given, patient verbalized understanding. Discharged from facility via wheelchair in stable condition.

## 2025-06-02 NOTE — H&P
H&P  Gynecology      SUBJECTIVE:     History of Present Illness:  Sangeeta Paniagua is a 40 y.o.  who presents with missed . Please see [facility] and ultrasound result below.     Patient previously counseled on conservative, medical, and surgical management options, and elects for suction D&C at this time.       Review of patient's allergies indicates:  No Known Allergies    Past Medical History:   Diagnosis Date    Constipation     Heart palpitations 2021    Pregnancy with history of uterine myomectomy 2022    Uterine fibroids affecting pregnancy      Past Surgical History:   Procedure Laterality Date     SECTION N/A 2023    Procedure:  SECTION;  Surgeon: Justin De La Fuente IV, MD;  Location: [facility] L&D;  Service: OB/GYN;  Laterality: N/A;    MYOMECTOMY  2016     OB History          2    Para   1    Term   1            AB        Living   2         SAB        IAB        Ectopic        Multiple   0    Live Births   2               Family History   Problem Relation Name Age of Onset    Coronary artery disease Father      Diabetes Mellitus Father      Hyperlipidemia Father      Breast cancer Maternal Grandmother Christiana 75    Colon cancer Neg Hx      Ovarian cancer Neg Hx       Social History[1]    Current Facility-Administered Medications   Medication    0.9% NaCl infusion    doxycycline 200 mg in D5W 250 mL IVPB    mupirocin 2 % ointment       Review of Systems:    Review of Systems   Constitutional:  Negative for chills and fever.   Respiratory:  Negative for shortness of breath.    Cardiovascular:  Negative for chest pain.   Gastrointestinal:  Negative for nausea and vomiting.   Neurological:  Negative for headaches.          OBJECTIVE:     Vital Signs  Temp:  [98.2 °F (36.8 °C)] 98.2 °F (36.8 °C)  Pulse:  [73] 73  Resp:  [18] 18  SpO2:  [100 %] 100 %  BP: (103)/(64) 103/64    Physical Exam: deferred        Laboratory  Recent Results (from the past 96 hours)   HCG,  Quantitative    Collection Time: 25  3:26 PM   Result Value Ref Range    Beta HCG Quant 356.40 See Text mIU/mL   HCG, Quantitative    Collection Time: 25  9:37 AM   Result Value Ref Range    Beta HCG Quant 254.81 See Text mIU/mL   Type & Screen    Collection Time: 25  9:37 AM   Result Value Ref Range    Specimen Outdate 2025 23:59     Group & Rh A POS     Indirect Nu NEG    CBC with Differential    Collection Time: 25 12:53 PM   Result Value Ref Range    WBC 7.37 3.90 - 12.70 K/uL    RBC 4.21 4.00 - 5.40 M/uL    HGB 11.9 (L) 12.0 - 16.0 gm/dL    HCT 35.6 (L) 37.0 - 48.5 %    MCV 85 82 - 98 fL    MCH 28.3 27.0 - 31.0 pg    MCHC 33.4 32.0 - 36.0 g/dL    RDW 13.2 11.5 - 14.5 %    Platelet Count 236 150 - 450 K/uL    MPV 9.1 (L) 9.2 - 12.9 fL    Nucleated RBC 0 <=0 /100 WBC    Neut % 45.7 38 - 73 %    Lymph % 46.5 18 - 48 %    Mono % 6.5 4 - 15 %    Eos % 0.5 <=8 %    Basophil % 0.5 <=1.9 %    Imm Grans % 0.3 0.0 - 0.5 %    Neut # 3.36 1.8 - 7.7 K/uL    Lymph # 3.43 1 - 4.8 K/uL    Mono # 0.48 0.3 - 1 K/uL    Eos # 0.04 <=0.5 K/uL    Baso # 0.04 <=0.2 K/uL    Imm Grans # 0.02 0.00 - 0.04 K/uL       Diagnostic Results:    Result Narrative      Indication  ========  Indication: Estimation of Gestational Age  Comment: r/o ectopic no pain or bleeding  Indication: Advanced Maternal Age (AMA), Multigravida  Indication: Previous     History  ======  Previous Outcomes   3  Para 2  Pregnancies delivered at term (T) 2  Living children (L) 2  Risk Factors  History risk factors: Advanced maternal age  History risk factors: Hx   History risk factors: Hx Myomectomy    Fetal Lab Tests  ============  Test: hCG, quantitative  Sample taken: 2025  Result:  254  Test: hCG, quantitative  Sample taken: 2025  Result:  356  Test: hCG, quantitative  Sample taken: 2025  Result:  338    Method  ======  Transabdominal and transvaginal ultrasound examination. 2D Color Doppler.  View: Good view    Pregnancy  =========  Ba pregnancy. Number of embryos: 1    Dating  ======  LMP on: 2025  GA by LMP 5 w + 3 d  DAISY by LMP: 2026  Assigned: based on the LMP, selected on 2025  Assigned GA 5 w + 3 d  Assigned DAISY: 2026    Assessment  ==========  Gestational sac: uncertain  GS 3.0 mm 5w 0d Rempen  Location: too early to determine  GS D1 3.0 mm  GS D2 3.0 mm  Yolk sac: not visualized  Amniotic sac: not visualized  Embryo: not visualized    Maternal Structures  ===============  Uterus / Cervix  Uterus: Normal  Endometrial thickness, total 10.9 mm  Ovaries / Tubes / Adnexa  Rt ovary: Visualized  Rt ovary D1 47 mm  Rt ovary D2 31 mm  Rt ovary D3 25 mm  Rt ovary Vol 19.4 cmï¿½  Rt ovarian corpus luteum: visualized  Rt ovarian corpus luteum D1 17.0 mm  Rt ovarian corpus luteum D2 16.0 mm  Rt ovarian corpus luteum D3 17.0 mm  Lt ovary: Visualized  Lt ovary D1 30 mm  Lt ovary D2 27 mm  Lt ovary D3 19 mm  Lt ovary Vol 8.0 cmï¿½  Cul de Sac / Bladder / Kidneys / Other  Free fluid: No free fluid visualized    Impression  =========  Possible small gestational sac within the endometrium. No fetal pole, yolk sac.  Given falling Hcg levels, this is likely failed pregnancy.  Clinical correlation recommended.    Limitations  =========  Please note: Prenatal ultrasound studies have limitations. They do not detect all fetal, genetic, placental, and maternal abnormalities. A normal appearing prenatal  ultrasound is reassuring. However, it does not guarantee the absence of an abnormality or predict a normal outcome for the fetus or the mother.                                                      Sonographer: Kassy Muniz'  Electronically Signed by: Hannah Mclain at 2025-11:28    ASSESSMENT/PLAN:     There are no hospital problems to display for this patient.      Sangeeta Paniagua is a 40 y.o.  who presents for suction D&C for the management of missed .     1. Missed    -Patient previously counseled on conservative, medical, and surgical management options for missed . She elects for management at this time with suction D&C.   -Consents signed and scanned to chart  -GYN pre-op orders placed     Karly Michael MD  Obstetrics & Gynecology, PGY-1         [1]   Social History  Tobacco Use    Smoking status: Never    Smokeless tobacco: Never   Substance Use Topics    Alcohol use: Never    Drug use: Never

## 2025-06-02 NOTE — DISCHARGE SUMMARY
Discharge Summary  Gynecology      Admit Date: 2025    Discharge Date and Time: 2025     Attending Physician: Justin De La Fuente IV, MD    Principal Diagnoses: <principal problem not specified>    Active Hospital Problems    Diagnosis  POA    S/P dilation and curettage [Z98.890]  Not Applicable      Resolved Hospital Problems   No resolved problems to display.       Procedures: Procedure(s) (LRB):  DILATION AND CURETTAGE, UTERUS, USING SUCTION (N/A)    Discharged Condition: good    Hospital Course:   Sangetea Paniagua is a 40 y.o. y.o.  female who presented on 2025   for above procedures for the treatment of missed . Patient tolerated procedure. Post-operative course was uncomplicated.    On day of discharge, patient was urinating, ambulating, and tolerating a regular diet without difficulty. Pain was well controlled on PO medication. She was discharged home on POD#0 in stable condition with instructions to follow up with Dr. De La Fuente in 4 weeks.     Consults: None    Significant Diagnostic Studies:  Recent Labs   Lab 25  1253   WBC 7.37   HGB 11.9*   HCT 35.6*   MCV 85           Treatments:   1. Surgery as above    Disposition: Home or Self Care    Patient Instructions:   Current Discharge Medication List        START taking these medications    Details   acetaminophen (TYLENOL) 650 MG TbSR Take 1 tablet (650 mg total) by mouth every 6 (six) hours. Alternate between ibuprofen and tylenol every 3 hours. For example: @0800: ibuprofen 600mg @1100: tylenol 650mg @1400: ibuprofen 600mg @1700: tylenol 650 mg @2000: ibuprofen 600mg  Qty: 60 tablet, Refills: 1      !! docusate sodium (COLACE) 100 MG capsule Take 1 capsule (100 mg total) by mouth 2 (two) times daily.  Qty: 60 capsule, Refills: 1      ibuprofen (ADVIL,MOTRIN) 600 MG tablet Take 1 tablet (600 mg total) by mouth every 6 (six) hours. Alternate between ibuprofen and tylenol every 3 hours. For example: @0800: ibuprofen 600mg  @1100: tylenol 650mg @1400: ibuprofen 600mg @1700: tylenol 650 mg @2000: ibuprofen 600mg  Qty: 60 tablet, Refills: 1      oxyCODONE (ROXICODONE) 5 MG immediate release tablet Take 1 tablet (5 mg total) by mouth every 4 (four) hours as needed for Pain.  Qty: 5 tablet, Refills: 0    Associated Diagnoses: S/P dilation and curettage       !! - Potential duplicate medications found. Please discuss with provider.        CONTINUE these medications which have NOT CHANGED    Details   albuterol (PROVENTIL HFA) 90 mcg/actuation inhaler Inhale 2 puffs into the lungs every 6 (six) hours as needed for Wheezing or Shortness of Breath. Rescue  Qty: 18 g, Refills: 2    Associated Diagnoses: Mild intermittent asthma without complication      celecoxib (CELEBREX) 200 MG capsule Take 1 capsule (200 mg total) by mouth 2 (two) times daily.  Qty: 60 capsule, Refills: 2    Associated Diagnoses: Acute pain of right knee; Acute traumatic internal derangement of right knee, initial encounter      !! docusate sodium (COLACE) 100 MG capsule Take 2 capsules (200 mg total) by mouth 2 (two) times daily.  Qty: 120 capsule, Refills: 11    Associated Diagnoses: Constipation, unspecified constipation type      famotidine (PEPCID) 20 MG tablet Take 1 tablet (20 mg total) by mouth nightly as needed for Heartburn.  Qty: 30 tablet, Refills: 5      LINZESS 290 mcg Cap capsule Take 1 capsule (290 mcg total) by mouth before breakfast.  Qty: 30 capsule, Refills: 11    Associated Diagnoses: Constipation, unspecified constipation type      polyethylene glycol (GLYCOLAX) 17 gram/dose powder Take 17 g by mouth once daily.  Qty: 1700 g, Refills: 1       !! - Potential duplicate medications found. Please discuss with provider.        STOP taking these medications       predniSONE (DELTASONE) 20 MG tablet Comments:   Reason for Stopping:         spironolactone (ALDACTONE) 100 MG tablet Comments:   Reason for Stopping:               Discharge Procedure Orders   Diet  general     Lifting restrictions   Order Comments: LIFTING:  No lifting greater than 15 pounds for six weeks.     PELVIC REST:  No douching, tampons, or intercourse for 6 weeks.  If prescribed, vaginal estrogen cream may be used during the postoperative period.     Other restrictions (specify):   Order Comments: DRIVING:  No driving while on narcotics. Driving may be resumed initially with a competent passenger one to two weeks after surgery if no longer taking narcotics.     EXERCISE:  For six weeks your exercise should be limited to walking. You may walk as far as you wish, as long as you increase your level of exertion gradually and avoid slippery surfaces. You may climb stairs as needed to get around, but should not use stair climbing for exercise.     Call MD for:  temperature >100.4     Call MD for:  persistent nausea and vomiting     Call MD for:  severe uncontrolled pain     Call MD for:  difficulty breathing, headache or visual disturbances     Call MD for:  hives     Call MD for:   Order Comments: inability to void,urine is ketchup colored or you have large clots, vaginal bleeding is heavier than a period.     No dressing needed     Activity as tolerated   Order Comments: Return to normal activity slowly as you feel able.  For 6 weeks your exercise should be limited to walking.  You may walk as far as you wish, as long as you increase your level of exertion gradually and avoid slippery surfaces.     Shower on day dressing removed (No bath)   Order Comments: You may shower at any time but should avoid immersing any abdominal incisions in water for at least 2 weeks after surgery or until the wound is completely healed.  If given, please shower with Hibaclens soap until bottle is completely finish        Follow-up Information       Justin De La Fuente IV, MD Follow up in 4 week(s).    Specialties: Obstetrics, Obstetrics and Gynecology  Why: Post-Op  Contact information:  [address]  [address]  [phone]                             Karly Michael MD  Obstetrics & Gynecology, PGY-1

## 2025-06-02 NOTE — OP NOTE
OPERATIVE REPORT    DATE: 25    PREOPERATIVE DIAGNOSIS  1. Missed     POSTOPERATIVE DIAGNOSIS  1. same    PROCEDURE:  1. Suction D&C    SURGEON: Justin De La Fuente MD    ASSISTANT: Karly Michael, PGY-1    ANESTHESIA: General    COMPLICATIONS: None    EBL: 150    IV FLUIDS: see anesthesia note     URINE OUTPUT: 50cc    FINDINGS:   1.     PROCEDURE: Patient was taking to the operating room where general anesthesia was administered and found to be adequate.  Bimanual exam revealed the uterus to be anteverted. She was prepped and draped in the dorsal lithotomy position.  Two right angle retractors were placed in the vagina in the anterior and posterior aspect.  The anterior lip of the cervix was grasped with a single tooth tenaculum.  The uterus was sounded to approximately 9 cm.  The cervix was gently serially dilated with Hegar dilators to #8 dilator. The pressure was set at 40 mmHg. The number 8 curette was introduced into the uterus without difficulty. Several passes were made successfully returning tissue. Gentle sharp curettage was then performed in a clockwise fashion until gritty feeling was noted in all aspects of the uterus. The tenaculum was removed with persistent ooze seen from left tenaculum site. A figure of eight suture was thrown with 2-0 vicryl with excellent hemostasis noted at end of procedure.   Products of conception sent to pathology.     The patient tolerated the procedure well. Instrument and sponge counts were correct times 2. Patient tolerated the procedure well and was taken to the recovery room in stable condition    Sponge, lap, needle counts were correct x 2. The patient was taken to the recovery room in stable condition.    Karly Michael MD  Obstetrics & Gynecology, PGY-1

## 2025-06-03 VITALS
OXYGEN SATURATION: 99 % | HEIGHT: 71 IN | RESPIRATION RATE: 16 BRPM | DIASTOLIC BLOOD PRESSURE: 63 MMHG | BODY MASS INDEX: 23.94 KG/M2 | HEART RATE: 68 BPM | SYSTOLIC BLOOD PRESSURE: 100 MMHG | WEIGHT: 171 LBS | TEMPERATURE: 98 F

## 2025-06-06 LAB — M TECH ONLY: NORMAL

## 2025-06-10 LAB
DHEA SERPL-MCNC: NORMAL
ESTRIOL SERPL-MCNC: NORMAL NG/ML
ESTROGEN SERPL-MCNC: NORMAL PG/ML
INSULIN SERPL-ACNC: NORMAL U[IU]/ML
LAB AP CLINICAL INFORMATION: NORMAL
LAB AP GROSS DESCRIPTION: NORMAL
LAB AP PERFORMING LOCATION(S): NORMAL
LAB AP REPORT FOOTNOTES: NORMAL

## 2025-06-23 ENCOUNTER — OFFICE VISIT (OUTPATIENT)
Dept: SPORTS MEDICINE | Facility: CLINIC | Age: 40
End: 2025-06-23
Payer: COMMERCIAL

## 2025-06-23 VITALS — SYSTOLIC BLOOD PRESSURE: 104 MMHG | DIASTOLIC BLOOD PRESSURE: 68 MMHG | HEART RATE: 79 BPM

## 2025-06-23 DIAGNOSIS — M54.50 CHRONIC BILATERAL LOW BACK PAIN WITHOUT SCIATICA: Primary | ICD-10-CM

## 2025-06-23 DIAGNOSIS — M25.551 BILATERAL HIP PAIN: ICD-10-CM

## 2025-06-23 DIAGNOSIS — M99.05 SOMATIC DYSFUNCTION OF PELVIC REGION: ICD-10-CM

## 2025-06-23 DIAGNOSIS — M99.08 SOMATIC DYSFUNCTION OF RIB CAGE REGION: ICD-10-CM

## 2025-06-23 DIAGNOSIS — M21.70 ACQUIRED SHORT LEG SYNDROME ON RIGHT: ICD-10-CM

## 2025-06-23 DIAGNOSIS — M99.03 SOMATIC DYSFUNCTION OF LUMBAR REGION: ICD-10-CM

## 2025-06-23 DIAGNOSIS — M99.02 SOMATIC DYSFUNCTION OF THORACIC REGION: ICD-10-CM

## 2025-06-23 DIAGNOSIS — M99.04 SACRAL REGION SOMATIC DYSFUNCTION: ICD-10-CM

## 2025-06-23 DIAGNOSIS — G89.29 CHRONIC BILATERAL LOW BACK PAIN WITHOUT SCIATICA: Primary | ICD-10-CM

## 2025-06-23 DIAGNOSIS — M99.06 SOMATIC DYSFUNCTION OF LOWER EXTREMITY: ICD-10-CM

## 2025-06-23 DIAGNOSIS — M79.10 MYALGIA: ICD-10-CM

## 2025-06-23 DIAGNOSIS — M25.552 BILATERAL HIP PAIN: ICD-10-CM

## 2025-06-23 PROCEDURE — 99999 PR PBB SHADOW E&M-EST. PATIENT-LVL III: CPT | Mod: PBBFAC,,, | Performed by: NEUROMUSCULOSKELETAL MEDICINE & OMM

## 2025-06-23 RX ORDER — METHOCARBAMOL 500 MG/1
500 TABLET, FILM COATED ORAL 3 TIMES DAILY PRN
Qty: 15 TABLET | Refills: 0 | Status: SHIPPED | OUTPATIENT
Start: 2025-06-23

## 2025-06-23 NOTE — PROGRESS NOTES
Subjective:     Sangeeta Paniagua     Chief Complaint   Patient presents with    Pain     Back      HPI    Sangeeta is a 40 y.o. female coming in today for follow up of right hip pain. Since last visit the pain has persisted without improvement in her lower back and bilateral hips. She notes that this started 2 years ago with a fall on concrete steps while pregnant. Pt is not compliant with HEP. She does note relief with OMT in the past, though had some expected bruising with FDM. She notes relief with meloxicam, as well as losing 15 lbs. She also reports that she has not been using the heel lift as previously recommended.  Pt also did not continue with the HEP as recommended when she completed PT in the past. The pain is better with rest, NSAID (celebrex, fair relief) and worse with activity, lying on side, twisting. Pt also reports taking flexeril 1x/month, but she states that she does not tolerate the side effects well (mostly drowsiness). Pt. describes the pain as a 8/10 achy pain that does not radiate. Denies BLE numbness/tingling. There has not been any new a fall/injury/ or traumas since last visit.  Pt. denies any new musculoskeletal complaints at this time.     Office note from 7/10/24 reviewed    Joint instability? no  Mechanical locking/clicking? no   Affecting ADL's? yes  Affecting sleep? 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    PAST MEDICAL HISTORY:   Past Medical History:   Diagnosis Date    Constipation     Heart palpitations 2021    Pregnancy with history of uterine myomectomy 2022    Uterine fibroids affecting pregnancy      PAST SURGICAL HISTORY:   Past Surgical History:   Procedure Laterality Date     SECTION N/A 2023    Procedure:  SECTION;  Surgeon: Justin De La Fuente IV, MD;  Location: [facility] L&D;  Service: OB/GYN;  Laterality: N/A;    DILATION AND CURETTAGE OF UTERUS USING SUCTION N/A 2025    Procedure: DILATION AND CURETTAGE, UTERUS, USING  SUCTION;  Surgeon: Justin De La Fuente IV, MD;  Location: [facility];  Service: OB/GYN;  Laterality: N/A;    MYOMECTOMY  06/2016     FAMILY HISTORY:   Family History   Problem Relation Name Age of Onset    Coronary artery disease Father      Diabetes Mellitus Father      Hyperlipidemia Father      Breast cancer Maternal Grandmother Christiana 75    Colon cancer Neg Hx      Ovarian cancer Neg Hx       SOCIAL HISTORY:   Social History     Socioeconomic History    Marital status:    Tobacco Use    Smoking status: Never    Smokeless tobacco: Never   Substance and Sexual Activity    Alcohol use: Never    Drug use: Never    Sexual activity: Yes     Partners: Male     Comment:   - Paz Paniagua     Social Drivers of Health     Financial Resource Strain: Low Risk  (7/10/2024)    Overall Financial Resource Strain (CARDIA)     Difficulty of Paying Living Expenses: Not hard at all   Food Insecurity: No Food Insecurity (7/10/2024)    Hunger Vital Sign     Worried About Running Out of Food in the Last Year: Never true     Ran Out of Food in the Last Year: Never true   Transportation Needs: No Transportation Needs (12/6/2023)    PRAPARE - Transportation     Lack of Transportation (Medical): No     Lack of Transportation (Non-Medical): No   Physical Activity: Insufficiently Active (7/10/2024)    Exercise Vital Sign     Days of Exercise per Week: 1 day     Minutes of Exercise per Session: 30 min   Stress: Stress Concern Present (7/10/2024)    [facility] - Occupational Stress Questionnaire     Feeling of Stress : To some extent   Housing Stability: Low Risk  (12/6/2023)    Housing Stability Vital Sign     Unable to Pay for Housing in the Last Year: No     Number of Places Lived in the Last Year: 1     Unstable Housing in the Last Year: No     MEDICATIONS:   Current Outpatient Medications:     celecoxib (CELEBREX) 200 MG capsule, Take 1 capsule (200 mg total) by mouth 2 (two) times daily., Disp: 60  capsule, Rfl: 2    docusate sodium (COLACE) 100 MG capsule, Take 2 capsules (200 mg total) by mouth 2 (two) times daily., Disp: 120 capsule, Rfl: 11    docusate sodium (COLACE) 100 MG capsule, Take 1 capsule (100 mg total) by mouth 2 (two) times daily., Disp: 60 capsule, Rfl: 1    famotidine (PEPCID) 20 MG tablet, Take 1 tablet (20 mg total) by mouth nightly as needed for Heartburn., Disp: 30 tablet, Rfl: 5    ibuprofen (ADVIL,MOTRIN) 600 MG tablet, Take 1 tablet (600 mg total) by mouth every 6 (six) hours. Alternate between ibuprofen and tylenol every 3 hours. For example: @0800: ibuprofen 600mg @1100: tylenol 650mg @1400: ibuprofen 600mg @1700: tylenol 650 mg @2000: ibuprofen 600mg, Disp: 60 tablet, Rfl: 1    LINZESS 290 mcg Cap capsule, Take 1 capsule (290 mcg total) by mouth before breakfast., Disp: 30 capsule, Rfl: 11    polyethylene glycol (GLYCOLAX) 17 gram/dose powder, Take 17 g by mouth once daily., Disp: 1700 g, Rfl: 1    acetaminophen (TYLENOL) 650 MG TbSR, Take 1 tablet (650 mg total) by mouth every 6 (six) hours. Alternate between ibuprofen and tylenol every 3 hours. For example: @0800: ibuprofen 600mg @1100: tylenol 650mg @1400: ibuprofen 600mg @1700: tylenol 650 mg @2000: ibuprofen 600mg (Patient not taking: Reported on 6/23/2025), Disp: 60 tablet, Rfl: 1    albuterol (PROVENTIL HFA) 90 mcg/actuation inhaler, Inhale 2 puffs into the lungs every 6 (six) hours as needed for Wheezing or Shortness of Breath. Rescue (Patient not taking: Reported on 6/23/2025), Disp: 18 g, Rfl: 2    methocarbamoL (ROBAXIN) 500 MG Tab, Take 1 tablet (500 mg total) by mouth 3 (three) times daily as needed (muscle spasm)., Disp: 15 tablet, Rfl: 0    oxyCODONE (ROXICODONE) 5 MG immediate release tablet, Take 1 tablet (5 mg total) by mouth every 4 (four) hours as needed for Pain. (Patient not taking: Reported on 6/23/2025), Disp: 5 tablet, Rfl: 0    ALLERGIES: Review of patient's allergies indicates:  No Known  Allergies    IMAGIN. MRI ordered due to low back pain taken 23.   2. MRI images were reviewed personally by me  3. FINDINGS:   The distal cord/ conus demonstrates normal size and appearance.  No evidence of fracture, marrow replacement process, or spondylo-discitis.  No paraspinal masses or inflammatory changes.  Degenerative changes/ spondylosis:  L1-2 and L2-3 are unremarkable.  At L3-4, there orestes small posterior annular tear/disc protrusion (series 6, image 8), mildly narrowing the spinal canal.  No significant neural foraminal narrowing.  At L4-5, there ismild disc bulging.  No spinal canal stenosis or significant neural foraminal narrowing.  At L5-S1, there ismild disc bulging.  No spinal canal stenosis or significant neural foraminal narrowing  Mild left posterior paraspinal muscle edema/strain (series 5, image 15).  4. IMPRESSION: Small annular tear at L3-4 and paraspinal muscle strain, as above. No fractures.     Objective:     VITAL SIGNS: /68 (Patient Position: Sitting)   Pulse 79    General    Vitals reviewed.  Constitutional: She is oriented to person, place, and time. She appears well-developed and well-nourished.   Neurological: She is alert and oriented to person, place, and time.   Psychiatric: She has a normal mood and affect. Her behavior is normal.           MUSCULOSKELETAL EXAM  HIP: right HIP  The affected hip is compared to the contralateral hip.    Observation:    There is no edema, erythema, or ecchymosis in the lumbosacral region.   There is no Trendelenburg sign on either side  + obvious pelvic obliquity while standing - improved with R 5 mm lift  No thoracolumbar scoliosis observed.    No midline skin abnormalities.  No atrophy noted in the lower limbs.  Gait: Non-antalgic with Neutral ankle mechanics and Neutral medial arch  + posterior pelvic tilt  + lower core inhibition    ROM (* = with pain):  Passive hip flexion to 120° on left and 120° on right  Passive hip internal  rotation to 45° on left* and 45° on right  Passive hip external rotation to 45° on left and 45° on right   Passive hip abduction to 45° on left and 45° on right    Tenderness To Palpation:  No tenderness at the ASIS, AIIS, PSIS, PIIS, iliac crest, pubic bones, ischial tuberosity.  No tenderness throughout the lumbar spine, iliolumbar region, or posterior pelvis.  No tenderness throughout the sacrum or piriformis  No tenderness over the greater trochanteric bursa on right  No tenderness at the glut attachments on the greater trochanter on right  No tenderness over proximal IT band   + iliacus muscle tenderness bilaterally    Strength Testing (* = with pain):  Hip flexion - 5/5 on left and 5/5 on right  Hip extension - 5/5 on left and 5/5 on right  Hip adduction - 5/5 on left and 5/5 on right  Hip abduction - 5/5 on left and 5/5 on right  Knee flexion - 5/5 on left and 5/5 on right  Knee extension - 5/5 on left and 5/5 on right  Glutaeus medius - 5-/5 on left and 5-/5 on right with compensatory lumbar rotation    Special Tests:  Standing Trendelenburg test - negative    Seated straight leg raise - negative  Supine straight leg raise - negative  Hamstring flexibility symmetric    Log roll - negative  JINA - positive for low back pain  FADIR - negative  Scour test - negative  No pain with posterior hip capsule compression    ASIS compression test - negative  SI drawer test - negative   Thigh thrust test - negative     Piriformis test (Bonnet's) - negative  Ely's test - negative  Quadriceps flexibility symmetric.  Joseluis test - positive bilaterally  Barber compression test - negative    Fulcrum test - negative    Leg lengths still asymmetric following OMT to the pelvis - R short leg - corrected with 5 mm lift    Neurovascular Exam:  Normal gait without Trendelenburg or antalgia.  2+ femoral, DP, and PT pulses BL.  No skin changes, no abnormal hair distribution.  Sensation intact to light touch throughout the obturator and  medial/lateral/posterior femoral cutaneous nerves.    TART (Tissue texture abnormality, Asymmetry,  Restriction of motion and/or Tenderness) changes:     Thoracic Spine   T1 Neutral   T2 Neutral   T3 Neutral   T4 Neutral   T5 Neutral   T6 Neutral   T7 Neutral   T8 Neutral   T9 Neutral   T10 Neutral   T11 NS-left,R-right   T12 NS-left,R-right     Rib cage: R11-12 TTA on right     Lumbar Spine   L1 NS-left,R-right   L2 NS-left,R-right   L3 Neutral   L4 FRS RIGHT   L5 FRS RIGHT     Pelvis:  Innominate:Left posterior rotation  Pubic bone:Left superior pubic shear    Sacrum:Left on Right sacral torsion extension    Lower extremity: bilateral iliacus  Herniated trigger point (HTP) fascial distortions     Key   F= Flexed   E = Extended   R = Rotated   S = Sidebent   TTA = tissue texture abnormality     Assessment:      Encounter Diagnoses   Name Primary?    Chronic bilateral low back pain without sciatica Yes    Acquired short leg syndrome on right     Bilateral hip pain     Myalgia     Somatic dysfunction of thoracic region     Somatic dysfunction of rib cage region     Somatic dysfunction of lumbar region     Sacral region somatic dysfunction     Somatic dysfunction of pelvic region     Somatic dysfunction of lower extremity           Plan:   Chronic low back and bilateral hip pain, likely secondary to weak gluteal muscles and poor pelvic/core stability with compensatory muscle firing patterns. Underlying right short-leg also likely contributing to biomechanical restrictions of the lower kinetic chain.   - Recommend 5mm right heel lift for underlying leg length discrepancy. Patient was given handout with further information today. All questions were answered.   - Referral to outpatient PT ([facility]) for increased pelvic stability with core and gluteal strengthening, neuromuscular retraining, diaphragm activation, and home exercise program  - OMT performed again today to address associated biomechanical  restrictions and HEP reviweed   - Continue Celebrex 200mg BID as needed for pain. Advised patient to avoid taking multiple NSAIDs at the same time.  - Recommend methocarbamol 500mg TID as needed for associated muscle spasms for her chronic low back pain  -  X-ray images of left hip taken 3/20/24 (AP pelvis and frogleg lateral  left views) showed Limited asymmetrical benign-appearing sclerotic change marrow space lateral left femoral neck, and non a asymmetrical hypertrophic change left superior lateral acetabulum. No fracture dislocation. Images were personally reviewed with patient.  - MRI images of lumbar spine taken 7/23/23 showed small annular tear at L3-4 and paraspinal muscle strain, as above. No fractures. Images were personally reviewed.    2. OMT 5-6 regions. Oral consent obtained.  Reviewed benefits and potential side effects, including bruising at site of treatment and increased soreness for the next 24-48 hours. Pt. Instructed to increased water intake by 1 L today and tomorrow to help with any residual soreness.   - OMT indicated today due to signs and symptoms as well as local and remote somatic dysfunction findings and their related neurokinetic, lymphatic, fascial and/or arteriovenous body connections.   - OMT techniques used: Myofascial Release, Muscle Energy, Counterstrain, Fascial Distortion Model, and Articulatory   - Treatment was tolerated well. Improvement noted in segmental mobility post-treatment in dysfunctional regions. There were no adverse events and no complications immediately following treatment.     3. Patient was taught the following HEP:  A)  Pelvic clock exercises given to do from the 6-12 o'clock positions:10-15 reps, twice daily. Hand out of exercise also given.   B) Seated anterior pelvic tilt exercise: rotate pelvis forward to sit on ischial tuberosities while maintaining neutral shoulder positioning. Repeat frequently throughout the day.    C) Supine lumbar rotation exercise  with flexed knees:  Repeat 10 times, twice daily.  Handout given.   D) Bilateral Psoas lunge stretch: hold stretch for 30 seconds, repeat 2-3 times, twice daily     13489 HOME EXERCISE PROGRAM (HEP):  The patient was taught a homegoing physical therapy regimen as described above. The patient demonstrated understanding of the exercises and proper technique of their execution. This interaction took 15 minutes.     4. Follow-up in 4 weeks for reevaluation    5. Patient agreeable to today's plan and all questions were answered    This note is dictated using the M*Modal Fluency Direct word recognition program. There are word recognition mistakes that are occasionally missed on review.

## 2025-09-04 ENCOUNTER — HOSPITAL ENCOUNTER (OUTPATIENT)
Dept: RADIOLOGY | Facility: HOSPITAL | Age: 40
Discharge: HOME | End: 2025-09-04
Attending: STUDENT IN AN ORGANIZED HEALTH CARE EDUCATION/TRAINING PROGRAM
Payer: COMMERCIAL

## 2025-09-04 DIAGNOSIS — Z12.31 ENCOUNTER FOR SCREENING MAMMOGRAM FOR MALIGNANT NEOPLASM OF BREAST: ICD-10-CM

## 2025-09-04 PROCEDURE — 77063 BREAST TOMOSYNTHESIS BI: CPT | Mod: TC,PO

## (undated) DEVICE — SOL IRR SOD CHL .9% POUR

## (undated) DEVICE — Device

## (undated) DEVICE — SOL POVIDONE PREP IODINE 4 OZ

## (undated) DEVICE — VACURETTE 8MM CURVED

## (undated) DEVICE — GLOVE SENSICARE PI MICRO 7.5

## (undated) DEVICE — SOL POVIDONE SCRUB IODINE 4 OZ

## (undated) DEVICE — CURETTE MEDGYN RIG CRV DISP